# Patient Record
Sex: FEMALE | Race: WHITE | Employment: UNEMPLOYED | URBAN - METROPOLITAN AREA
[De-identification: names, ages, dates, MRNs, and addresses within clinical notes are randomized per-mention and may not be internally consistent; named-entity substitution may affect disease eponyms.]

---

## 2018-02-16 ENCOUNTER — OFFICE VISIT (OUTPATIENT)
Dept: FAMILY MEDICINE CLINIC | Facility: CLINIC | Age: 63
End: 2018-02-16

## 2018-02-16 VITALS
BODY MASS INDEX: 25.78 KG/M2 | SYSTOLIC BLOOD PRESSURE: 122 MMHG | OXYGEN SATURATION: 98 % | TEMPERATURE: 98 F | RESPIRATION RATE: 18 BRPM | HEIGHT: 64 IN | WEIGHT: 151 LBS | HEART RATE: 78 BPM | DIASTOLIC BLOOD PRESSURE: 80 MMHG

## 2018-02-16 DIAGNOSIS — L98.9 SKIN LESION OF CHEEK: ICD-10-CM

## 2018-02-16 DIAGNOSIS — N83.8 ENLARGED OVARY: ICD-10-CM

## 2018-02-16 DIAGNOSIS — Z01.419 WELL WOMAN EXAM: Primary | ICD-10-CM

## 2018-02-16 DIAGNOSIS — Z23 NEED FOR TDAP VACCINATION: ICD-10-CM

## 2018-02-16 DIAGNOSIS — Z12.31 VISIT FOR SCREENING MAMMOGRAM: ICD-10-CM

## 2018-02-16 DIAGNOSIS — D22.9 MULTIPLE NEVI: ICD-10-CM

## 2018-02-16 PROCEDURE — 99396 PREV VISIT EST AGE 40-64: CPT | Performed by: FAMILY MEDICINE

## 2018-02-16 PROCEDURE — 87624 HPV HI-RISK TYP POOLED RSLT: CPT | Performed by: FAMILY MEDICINE

## 2018-02-16 PROCEDURE — 90471 IMMUNIZATION ADMIN: CPT | Performed by: FAMILY MEDICINE

## 2018-02-16 PROCEDURE — 88175 CYTOPATH C/V AUTO FLUID REDO: CPT | Performed by: FAMILY MEDICINE

## 2018-02-16 PROCEDURE — 90715 TDAP VACCINE 7 YRS/> IM: CPT | Performed by: FAMILY MEDICINE

## 2018-02-16 RX ORDER — INFLUENZA VIRUS VACCINE 15; 15; 15; 15 UG/.5ML; UG/.5ML; UG/.5ML; UG/.5ML
SUSPENSION INTRAMUSCULAR
Refills: 0 | COMMUNITY
Start: 2018-01-02 | End: 2018-02-23

## 2018-02-16 NOTE — PROGRESS NOTES
El Flood is a 63year old female. HPI:  Pt is here for WWE  Postmenopausal, no menses now for about 4 years  No abnormal vag d/c, no spotting, no pelvic pain. No sig vaginal dryness  occ Urge incontinence. no SALLY  No dysuria  No abdominal pain.   Exerc denies abdominal pain and denies heartburn  : as above  NEURO: denies headaches, denies dizziness    EXAM:  /80   Pulse 78   Temp 98.2 °F (36.8 °C) (Oral)   Resp 18   Ht 64\"   Wt 151 lb   SpO2 98%   BMI 25.92 kg/m²   Wt Readings from Last 6 Encoun help w/ constipation.   - THINPREP PAP WITH HPV REFLEX REQUEST B;- HPV REFLEX PAP NEG W/ GENOTYPE    2. Visit for screening mammogram  - [facility] SCREENING BILAT (CPT=77067); Future    3. Enlarged ovary  Pt w/ bulkiness of L side of uterus and adnexa on exam. ?

## 2018-02-20 LAB — HPV I/H RISK 1 DNA SPEC QL NAA+PROBE: NEGATIVE

## 2018-02-21 ENCOUNTER — NURSE ONLY (OUTPATIENT)
Dept: FAMILY MEDICINE CLINIC | Facility: CLINIC | Age: 63
End: 2018-02-21

## 2018-02-21 ENCOUNTER — HOSPITAL ENCOUNTER (OUTPATIENT)
Dept: ULTRASOUND IMAGING | Age: 63
Discharge: HOME | End: 2018-02-21
Attending: FAMILY MEDICINE
Payer: COMMERCIAL

## 2018-02-21 ENCOUNTER — HOSPITAL ENCOUNTER (OUTPATIENT)
Dept: MAMMOGRAPHY | Age: 63
Discharge: HOME | End: 2018-02-21
Attending: FAMILY MEDICINE
Payer: COMMERCIAL

## 2018-02-21 DIAGNOSIS — N83.8 ENLARGED OVARY: ICD-10-CM

## 2018-02-21 DIAGNOSIS — Z13.21 ENCOUNTER FOR VITAMIN DEFICIENCY SCREENING: ICD-10-CM

## 2018-02-21 DIAGNOSIS — Z00.00 LABORATORY EXAMINATION ORDERED AS PART OF A COMPLETE PHYSICAL EXAMINATION: Primary | ICD-10-CM

## 2018-02-21 DIAGNOSIS — Z12.31 VISIT FOR SCREENING MAMMOGRAM: ICD-10-CM

## 2018-02-21 LAB
BASOPHILS # BLD AUTO: 0.03 X10(3) UL (ref 0–0.1)
BASOPHILS NFR BLD AUTO: 0.7 %
EOSINOPHIL # BLD AUTO: 0.1 X10(3) UL (ref 0–0.3)
EOSINOPHIL NFR BLD AUTO: 2.3 %
ERYTHROCYTE [DISTWIDTH] IN BLOOD BY AUTOMATED COUNT: 13 % (ref 11.5–16)
HCT VFR BLD AUTO: 39.8 % (ref 34–50)
HGB BLD-MCNC: 12.5 G/DL (ref 12–16)
IMMATURE GRANULOCYTE COUNT: 0.01 X10(3) UL (ref 0–1)
IMMATURE GRANULOCYTE RATIO %: 0.2 %
LYMPHOCYTES # BLD AUTO: 1.19 X10(3) UL (ref 0.9–4)
LYMPHOCYTES NFR BLD AUTO: 27.5 %
MCH RBC QN AUTO: 28.3 PG (ref 27–33.2)
MCHC RBC AUTO-ENTMCNC: 31.4 G/DL (ref 31–37)
MCV RBC AUTO: 90.2 FL (ref 81–100)
MONOCYTES # BLD AUTO: 0.29 X10(3) UL (ref 0.1–1)
MONOCYTES NFR BLD AUTO: 6.7 %
NEUTROPHIL ABS PRELIM: 2.7 X10 (3) UL (ref 1.3–6.7)
NEUTROPHILS # BLD AUTO: 2.7 X10(3) UL (ref 1.3–6.7)
NEUTROPHILS NFR BLD AUTO: 62.6 %
PLATELET # BLD AUTO: 168 10(3)UL (ref 150–450)
RBC # BLD AUTO: 4.41 X10(6)UL (ref 3.8–5.1)
RED CELL DISTRIBUTION WIDTH-SD: 43.2 FL (ref 35.1–46.3)
WBC # BLD AUTO: 4.3 X10(3) UL (ref 4–13)

## 2018-02-21 PROCEDURE — 76830 TRANSVAGINAL US NON-OB: CPT | Performed by: FAMILY MEDICINE

## 2018-02-21 PROCEDURE — 77067 SCR MAMMO BI INCL CAD: CPT | Performed by: FAMILY MEDICINE

## 2018-02-21 PROCEDURE — 76856 US EXAM PELVIC COMPLETE: CPT | Performed by: FAMILY MEDICINE

## 2018-02-21 PROCEDURE — 36415 COLL VENOUS BLD VENIPUNCTURE: CPT | Performed by: FAMILY MEDICINE

## 2018-02-21 PROCEDURE — 80050 GENERAL HEALTH PANEL: CPT | Performed by: FAMILY MEDICINE

## 2018-02-21 PROCEDURE — 82306 VITAMIN D 25 HYDROXY: CPT | Performed by: FAMILY MEDICINE

## 2018-02-21 PROCEDURE — 80061 LIPID PANEL: CPT | Performed by: FAMILY MEDICINE

## 2018-02-22 LAB
25-HYDROXYVITAMIN D (TOTAL): 18.5 NG/ML (ref 30–100)
ALBUMIN SERPL-MCNC: 4.2 G/DL (ref 3.5–4.8)
ALP LIVER SERPL-CCNC: 93 U/L (ref 46–118)
ALT SERPL-CCNC: 19 U/L (ref 14–54)
AST SERPL-CCNC: 21 U/L (ref 15–41)
BILIRUB SERPL-MCNC: 0.5 MG/DL (ref 0.1–2)
BUN BLD-MCNC: 18 MG/DL (ref 8–20)
CALCIUM BLD-MCNC: 9.6 MG/DL (ref 8.3–10.3)
CHLORIDE: 107 MMOL/L (ref 101–111)
CHOLEST SMN-MCNC: 232 MG/DL (ref ?–200)
CO2: 29 MMOL/L (ref 22–32)
CREAT BLD-MCNC: 1.08 MG/DL (ref 0.55–1.02)
GLUCOSE BLD-MCNC: 85 MG/DL (ref 70–99)
HDLC SERPL-MCNC: 94 MG/DL (ref 45–?)
HDLC SERPL: 2.47 {RATIO} (ref ?–4.44)
LDLC SERPL CALC-MCNC: 120 MG/DL (ref ?–130)
M PROTEIN MFR SERPL ELPH: 8.1 G/DL (ref 6.1–8.3)
NONHDLC SERPL-MCNC: 138 MG/DL (ref ?–130)
POTASSIUM SERPL-SCNC: 4.5 MMOL/L (ref 3.6–5.1)
SODIUM SERPL-SCNC: 141 MMOL/L (ref 136–144)
TRIGL SERPL-MCNC: 92 MG/DL (ref ?–150)
TSI SER-ACNC: 1.44 MIU/ML (ref 0.35–5.5)
VLDLC SERPL CALC-MCNC: 18 MG/DL (ref 5–40)

## 2018-02-23 PROBLEM — D12.6 TUBULAR ADENOMA OF COLON: Status: ACTIVE | Noted: 2018-02-23

## 2018-03-06 ENCOUNTER — OFFICE VISIT (OUTPATIENT)
Dept: FAMILY MEDICINE CLINIC | Facility: CLINIC | Age: 63
End: 2018-03-06

## 2018-03-06 VITALS
OXYGEN SATURATION: 98 % | DIASTOLIC BLOOD PRESSURE: 66 MMHG | SYSTOLIC BLOOD PRESSURE: 106 MMHG | BODY MASS INDEX: 26.18 KG/M2 | TEMPERATURE: 98 F | HEIGHT: 64 IN | HEART RATE: 59 BPM | WEIGHT: 153.38 LBS | RESPIRATION RATE: 16 BRPM

## 2018-03-06 DIAGNOSIS — E55.9 VITAMIN D DEFICIENCY: ICD-10-CM

## 2018-03-06 DIAGNOSIS — N85.9 LESION OF ENDOMETRIUM: ICD-10-CM

## 2018-03-06 DIAGNOSIS — S33.5XXA LUMBAR SPRAIN, INITIAL ENCOUNTER: ICD-10-CM

## 2018-03-06 DIAGNOSIS — N83.202 CYST OF LEFT OVARY: Primary | ICD-10-CM

## 2018-03-06 PROCEDURE — 99213 OFFICE O/P EST LOW 20 MIN: CPT | Performed by: FAMILY MEDICINE

## 2018-03-13 NOTE — PROGRESS NOTES
Sarah Banks is a 66year old female. HPI:  Patient is here for follow-up on recent test results. Also having some acute low back pain since yesterday. Pain is across lower back,  Pain worse with certain movements.    No pain radiating down the lower extre back pain, pleasant affect   back: No spinal tenderness. Bilateral paralumbar tenderness /spasm. Negative straight leg raise. Motor strength 5/5 bilateral lower extremities. Normal gait      Assessment and plan:    1. Cyst of left ovary  2.  Isamar Karina

## 2018-03-23 RX ORDER — PYRIDOXINE HCL (VITAMIN B6) 100 MG
TABLET ORAL
COMMUNITY

## 2018-03-23 RX ORDER — MELATONIN
325
COMMUNITY
End: 2020-11-03

## 2018-03-23 RX ORDER — MAGNESIUM OXIDE 400 MG (241.3 MG MAGNESIUM) TABLET
100 TABLET DAILY
COMMUNITY

## 2018-03-24 ENCOUNTER — ANESTHESIA EVENT (OUTPATIENT)
Dept: SURGERY | Facility: HOSPITAL | Age: 63
End: 2018-03-24
Payer: COMMERCIAL

## 2018-04-03 ENCOUNTER — SURGERY (OUTPATIENT)
Age: 63
End: 2018-04-03

## 2018-04-03 ENCOUNTER — HOSPITAL ENCOUNTER (OUTPATIENT)
Facility: HOSPITAL | Age: 63
Setting detail: HOSPITAL OUTPATIENT SURGERY
Discharge: HOME | End: 2018-04-03
Attending: OBSTETRICS & GYNECOLOGY | Admitting: OBSTETRICS & GYNECOLOGY
Payer: COMMERCIAL

## 2018-04-03 ENCOUNTER — ANESTHESIA (OUTPATIENT)
Dept: SURGERY | Facility: HOSPITAL | Age: 63
End: 2018-04-03
Payer: COMMERCIAL

## 2018-04-03 VITALS
RESPIRATION RATE: 16 BRPM | BODY MASS INDEX: 25.82 KG/M2 | TEMPERATURE: 98 F | HEIGHT: 64 IN | OXYGEN SATURATION: 100 % | SYSTOLIC BLOOD PRESSURE: 127 MMHG | WEIGHT: 151.25 LBS | HEART RATE: 73 BPM | DIASTOLIC BLOOD PRESSURE: 73 MMHG

## 2018-04-03 PROBLEM — N83.202 CYST OF LEFT OVARY: Status: ACTIVE | Noted: 2018-04-03

## 2018-04-03 PROCEDURE — 88305 TISSUE EXAM BY PATHOLOGIST: CPT | Performed by: OBSTETRICS & GYNECOLOGY

## 2018-04-03 PROCEDURE — 0UT24ZZ RESECTION OF BILATERAL OVARIES, PERCUTANEOUS ENDOSCOPIC APPROACH: ICD-10-PCS | Performed by: OBSTETRICS & GYNECOLOGY

## 2018-04-03 PROCEDURE — 0UT74ZZ RESECTION OF BILATERAL FALLOPIAN TUBES, PERCUTANEOUS ENDOSCOPIC APPROACH: ICD-10-PCS | Performed by: OBSTETRICS & GYNECOLOGY

## 2018-04-03 RX ORDER — MIDAZOLAM HYDROCHLORIDE 1 MG/ML
1 INJECTION INTRAMUSCULAR; INTRAVENOUS EVERY 5 MIN PRN
Status: DISCONTINUED | OUTPATIENT
Start: 2018-04-03 | End: 2018-04-03

## 2018-04-03 RX ORDER — HYDROCODONE BITARTRATE AND ACETAMINOPHEN 5; 325 MG/1; MG/1
1 TABLET ORAL AS NEEDED
Status: DISCONTINUED | OUTPATIENT
Start: 2018-04-03 | End: 2018-04-03

## 2018-04-03 RX ORDER — SODIUM CHLORIDE, SODIUM LACTATE, POTASSIUM CHLORIDE, CALCIUM CHLORIDE 600; 310; 30; 20 MG/100ML; MG/100ML; MG/100ML; MG/100ML
INJECTION, SOLUTION INTRAVENOUS CONTINUOUS
Status: DISCONTINUED | OUTPATIENT
Start: 2018-04-03 | End: 2018-04-03

## 2018-04-03 RX ORDER — ONDANSETRON 2 MG/ML
4 INJECTION INTRAMUSCULAR; INTRAVENOUS AS NEEDED
Status: DISCONTINUED | OUTPATIENT
Start: 2018-04-03 | End: 2018-04-03

## 2018-04-03 RX ORDER — HYDROCODONE BITARTRATE AND ACETAMINOPHEN 5; 325 MG/1; MG/1
2 TABLET ORAL AS NEEDED
Status: DISCONTINUED | OUTPATIENT
Start: 2018-04-03 | End: 2018-04-03

## 2018-04-03 RX ORDER — DEXAMETHASONE SODIUM PHOSPHATE 4 MG/ML
VIAL (ML) INJECTION
Status: COMPLETED
Start: 2018-04-03 | End: 2018-04-03

## 2018-04-03 RX ORDER — NALOXONE HYDROCHLORIDE 0.4 MG/ML
80 INJECTION, SOLUTION INTRAMUSCULAR; INTRAVENOUS; SUBCUTANEOUS AS NEEDED
Status: DISCONTINUED | OUTPATIENT
Start: 2018-04-03 | End: 2018-04-03

## 2018-04-03 RX ORDER — DIPHENHYDRAMINE HYDROCHLORIDE 50 MG/ML
12.5 INJECTION INTRAMUSCULAR; INTRAVENOUS AS NEEDED
Status: DISCONTINUED | OUTPATIENT
Start: 2018-04-03 | End: 2018-04-03

## 2018-04-03 RX ORDER — ALPRAZOLAM 0.25 MG/1
0.25 TABLET ORAL NIGHTLY PRN
COMMUNITY
End: 2020-11-03

## 2018-04-03 RX ORDER — CLINDAMYCIN PHOSPHATE 900 MG/50ML
INJECTION INTRAVENOUS
Status: DISCONTINUED
Start: 2018-04-03 | End: 2018-04-03

## 2018-04-03 RX ORDER — CLINDAMYCIN PHOSPHATE 900 MG/50ML
900 INJECTION INTRAVENOUS ONCE
Status: DISCONTINUED | OUTPATIENT
Start: 2018-04-03 | End: 2018-04-03 | Stop reason: HOSPADM

## 2018-04-03 RX ORDER — BUPIVACAINE HYDROCHLORIDE AND EPINEPHRINE 2.5; 5 MG/ML; UG/ML
INJECTION, SOLUTION EPIDURAL; INFILTRATION; INTRACAUDAL; PERINEURAL AS NEEDED
Status: DISCONTINUED | OUTPATIENT
Start: 2018-04-03 | End: 2018-04-03 | Stop reason: HOSPADM

## 2018-04-03 RX ORDER — DEXAMETHASONE SODIUM PHOSPHATE 4 MG/ML
4 VIAL (ML) INJECTION AS NEEDED
Status: DISCONTINUED | OUTPATIENT
Start: 2018-04-03 | End: 2018-04-03

## 2018-04-03 RX ORDER — METOCLOPRAMIDE HYDROCHLORIDE 5 MG/ML
10 INJECTION INTRAMUSCULAR; INTRAVENOUS AS NEEDED
Status: DISCONTINUED | OUTPATIENT
Start: 2018-04-03 | End: 2018-04-03

## 2018-04-03 RX ORDER — MORPHINE SULFATE 4 MG/ML
2 INJECTION, SOLUTION INTRAMUSCULAR; INTRAVENOUS EVERY 5 MIN PRN
Status: DISCONTINUED | OUTPATIENT
Start: 2018-04-03 | End: 2018-04-03

## 2018-04-03 RX ORDER — MEPERIDINE HYDROCHLORIDE 25 MG/ML
12.5 INJECTION INTRAMUSCULAR; INTRAVENOUS; SUBCUTANEOUS AS NEEDED
Status: DISCONTINUED | OUTPATIENT
Start: 2018-04-03 | End: 2018-04-03

## 2018-04-03 NOTE — H&P
[facility]    PATIENT'S NAME: Carina Maria Fernanda, [de-identified]   ATTENDING PHYSICIAN: Svetlana Perez M.D.    PATIENT ACCOUNT#:   [de-identified]    LOCATION:  [facility]  MEDICAL RECORD #:   [id number]       YOB: 1955  ADMISSION DATE:       04/03/2018 Cervix, vagina appeared normal.  Uterus, normal size. Left side fullness felt. The ultrasound revealed uterus 7 x 4 x 4 cm. The endometrium, 3 mm. Two small fibroids noted in the anterior and posterior wall of the uterus.   Right ovary appeared normal

## 2018-04-03 NOTE — BRIEF OP NOTE
Pre-Operative Diagnosis: LEFT ADNEXAL CYST, ABDOMINAL PAIN     Post-Operative Diagnosis: LEFT ADNEXAL CYST, ABDOMINAL PAIN      Procedure Performed:   Procedure(s):  LAPAROSCOPIC BILATERAL SALPINGO OOPHORECTOMY WITH LEFT CYSTECTOMY    Surgeon(s) and Role:

## 2018-04-03 NOTE — ANESTHESIA PREPROCEDURE EVALUATION
PRE-OP EVALUATION    Patient Name: Sean Schumacher    Pre-op Diagnosis: LEFT ADNEXAL CYST, ABDOMINAL PAIN    Procedure(s):  LAPAROSCOPIC BILATERAL SALPINGO OOPHORECTOMY WITH LEFT CYSTECTOMY, POSSIBLE LAPAROTOMY    Surgeon(s) and Role:     * Hinkle Marrow 0.5 oz/week    1 Standard drinks or equivalent per week         Comment: Occasional        Drug use: No     Available pre-op labs reviewed.     Lab Results  Component Value Date   WBC 4.3 02/21/2018   RBC 4.41 02/21/2018   HGB 12.5 02/21/2018   HCT 39.8 02/

## 2018-04-03 NOTE — OPERATIVE REPORT
[facility]    PATIENT'S NAME: Miquel García, [de-identified]   ATTENDING PHYSICIAN: Penny Carrizales M.D. OPERATING PHYSICIAN: Penny Carrizales M.D.    PATIENT ACCOUNT#:   [de-identified]    [facility]  MEDICAL RECORD #:   [facility] the usual sterile fashion with a Betadine prep for the vagina and ChloraPrep for the abdomen. Now the time-out was performed identifying the patient name, date of birth, and the procedure.   Now attention was turned to the patient's vagina where a Mckeon ca cornua region. This was done with the LigaSure by serial clamping, coagulating, and cutting. Now the tube with ovarian cyst was released and this was placed in the cul-de-sac.   Now attention was turned to the patient's right side where the tube and the o removed. The patient tolerated the procedure well. All the instrument and sponge counts were reported to be correct. She did receive clindamycin as preoperative antibiotics.   She was awakened from anesthesia and taken to the recovery room in stable cond

## 2018-04-03 NOTE — ANESTHESIA POSTPROCEDURE EVALUATION
KI Seay 106 Patient Status:  Hospital Outpatient Surgery   Age/Gender 66year old female MRN [id number]   Location [address] [place] [address], [facility] Day # 0 PCP Jose Juan Carrasco MD

## 2020-06-04 ENCOUNTER — TELEPHONE (OUTPATIENT)
Dept: FAMILY MEDICINE CLINIC | Facility: CLINIC | Age: 65
End: 2020-06-04

## 2020-06-04 ENCOUNTER — VIRTUAL PHONE E/M (OUTPATIENT)
Dept: FAMILY MEDICINE CLINIC | Facility: CLINIC | Age: 65
End: 2020-06-04
Payer: COMMERCIAL

## 2020-06-04 DIAGNOSIS — L23.7 POISON IVY: Primary | ICD-10-CM

## 2020-06-04 PROCEDURE — 99213 OFFICE O/P EST LOW 20 MIN: CPT | Performed by: FAMILY MEDICINE

## 2020-06-04 RX ORDER — PREDNISONE 20 MG/1
20 TABLET ORAL DAILY
Qty: 5 TABLET | Refills: 0 | Status: SHIPPED | OUTPATIENT
Start: 2020-06-04 | End: 2020-09-04

## 2020-06-04 NOTE — PROGRESS NOTES
Please note that the following visit was completed using two-way, real-time interactive audio and video communication.   This has been done in good davian to provide continuity of care in the best interest of the provider-patient relationship, due to the o REVIEW OF SYSTEMS:  Review of Systems:   Constitutional: No fevers, chills, fatigue or night sweats. ENT: No mouth pain, neck pain, running nose, headaches or swollen glands.   Skin: As per HPI  Respiratory: Denies cough, wheezing or shortness of breat

## 2020-06-04 NOTE — TELEPHONE ENCOUNTER
Patient stated she contracted poison sumac told to her by a friend of hers that is a paramedic. Dr. Mejia Point doen't have an opening for a video until Monday. Patient cant wait that long. Please advise how she should proceed.

## 2020-06-04 NOTE — TELEPHONE ENCOUNTER
Please offer appt today for video visit with Dr. Theo Quinn or virtual with Dr. Sujata Beltran tomorrow.

## 2020-09-04 ENCOUNTER — TELEPHONE (OUTPATIENT)
Dept: FAMILY MEDICINE CLINIC | Facility: CLINIC | Age: 65
End: 2020-09-04

## 2020-09-04 DIAGNOSIS — L23.7 POISON IVY: ICD-10-CM

## 2020-09-04 RX ORDER — PREDNISONE 20 MG/1
20 TABLET ORAL DAILY
Qty: 5 TABLET | Refills: 0 | Status: SHIPPED | OUTPATIENT
Start: 2020-09-04 | End: 2020-09-09

## 2020-09-04 NOTE — TELEPHONE ENCOUNTER
Dr. Vladislav Garces,  Please advise    Patient was seen by Dr. Simona Lorenz 6/4/2020:   Romina Dash is a 63year old female who presents for development of a rash on her arms after she working in the backyard with weeds and mulch    Poison ivy  -Discussed contact dermatitis

## 2020-09-04 NOTE — TELEPHONE ENCOUNTER
The patient had poison ivy like she did a month ago. She is wondering if the doctor would be able to order medication for it again.      Patient currently is in [place], [address]

## 2020-09-04 NOTE — TELEPHONE ENCOUNTER
Spoke with patient. In [place] right now. States has rash similar to previous on bilateral upper extremities. Few lesions on each forearm. Today noted a new lesion. Rash is not as diffuse as it was previously. Started 5 days ago.   Thinks triggered b

## 2020-10-05 ENCOUNTER — TELEPHONE (OUTPATIENT)
Dept: FAMILY MEDICINE CLINIC | Facility: CLINIC | Age: 65
End: 2020-10-05

## 2020-10-05 NOTE — TELEPHONE ENCOUNTER
Patient LVM for the office stating she was exposed to someone at work who tested positive for COVID. Patient states she is going to quarantine however she is wanting to know if there is anything else she should be doing.

## 2020-10-05 NOTE — TELEPHONE ENCOUNTER
Called pt stating no direct contact with COVID positive co-worker. Informed close contact is considered less than 6 feet, more than 15mins. Co-worker passes by desk occasionally. No mask are worn in work place. Last exposure was Thursday (10/1/20).  Co-work

## 2020-11-03 ENCOUNTER — OFFICE VISIT (OUTPATIENT)
Dept: FAMILY MEDICINE CLINIC | Facility: CLINIC | Age: 65
End: 2020-11-03
Payer: COMMERCIAL

## 2020-11-03 VITALS
OXYGEN SATURATION: 99 % | RESPIRATION RATE: 16 BRPM | WEIGHT: 151 LBS | TEMPERATURE: 97 F | DIASTOLIC BLOOD PRESSURE: 68 MMHG | HEIGHT: 64 IN | SYSTOLIC BLOOD PRESSURE: 114 MMHG | HEART RATE: 78 BPM | BODY MASS INDEX: 25.78 KG/M2

## 2020-11-03 DIAGNOSIS — H61.22 IMPACTED CERUMEN, LEFT EAR: ICD-10-CM

## 2020-11-03 DIAGNOSIS — J30.1 SEASONAL ALLERGIC RHINITIS DUE TO POLLEN: ICD-10-CM

## 2020-11-03 DIAGNOSIS — Z01.419 WELL WOMAN EXAM WITH ROUTINE GYNECOLOGICAL EXAM: Primary | ICD-10-CM

## 2020-11-03 DIAGNOSIS — Z80.0 FAMILY HISTORY OF COLON CANCER: ICD-10-CM

## 2020-11-03 DIAGNOSIS — R51.9 SINUS HEADACHE: ICD-10-CM

## 2020-11-03 DIAGNOSIS — Z12.31 VISIT FOR SCREENING MAMMOGRAM: ICD-10-CM

## 2020-11-03 DIAGNOSIS — D12.6 TUBULAR ADENOMA OF COLON: ICD-10-CM

## 2020-11-03 DIAGNOSIS — R42 ORTHOSTATIC LIGHTHEADEDNESS: ICD-10-CM

## 2020-11-03 DIAGNOSIS — M54.42 CHRONIC LEFT-SIDED LOW BACK PAIN WITH LEFT-SIDED SCIATICA: ICD-10-CM

## 2020-11-03 DIAGNOSIS — D22.9 MULTIPLE NEVI: ICD-10-CM

## 2020-11-03 DIAGNOSIS — Z00.00 LABORATORY EXAMINATION ORDERED AS PART OF A COMPLETE PHYSICAL EXAMINATION: ICD-10-CM

## 2020-11-03 DIAGNOSIS — L98.9 FACIAL LESION: ICD-10-CM

## 2020-11-03 DIAGNOSIS — G89.29 CHRONIC LEFT-SIDED LOW BACK PAIN WITH LEFT-SIDED SCIATICA: ICD-10-CM

## 2020-11-03 PROCEDURE — 3078F DIAST BP <80 MM HG: CPT | Performed by: FAMILY MEDICINE

## 2020-11-03 PROCEDURE — 3074F SYST BP LT 130 MM HG: CPT | Performed by: FAMILY MEDICINE

## 2020-11-03 PROCEDURE — 3008F BODY MASS INDEX DOCD: CPT | Performed by: FAMILY MEDICINE

## 2020-11-03 PROCEDURE — 87624 HPV HI-RISK TYP POOLED RSLT: CPT | Performed by: FAMILY MEDICINE

## 2020-11-03 PROCEDURE — 99396 PREV VISIT EST AGE 40-64: CPT | Performed by: FAMILY MEDICINE

## 2020-11-03 RX ORDER — FLUTICASONE PROPIONATE 50 MCG
2 SPRAY, SUSPENSION (ML) NASAL DAILY
Qty: 1 BOTTLE | Refills: 1 | Status: SHIPPED | OUTPATIENT
Start: 2020-11-03 | End: 2021-10-29

## 2020-11-03 RX ORDER — CHOLECALCIFEROL (VITAMIN D3) 50 MCG
TABLET ORAL
COMMUNITY

## 2020-11-03 NOTE — PROGRESS NOTES
Dewey Espinoza is a 63year old female. HPI:  Pt is here for routine physical/WWE  Stopped working about 1 month ago  Had coronavirus contact when working last month, no sxs, did testing and was negative.    Has been social distancing  Does a lot of walking an Allergic rhinitis due to other allergen    • Asthma    • Family history of other cardiovascular diseases    • H/O colonoscopy    • Tubular adenoma of colon    • Urinary tract infection, site not specified        Past Surgical History:   Procedure Lateralit atraumatic, normocephalic,  Conj clear, EOMI, PERRL  TMs: Left ear canal with dense cerumen impaction, unable to visualize TM. Right ear canal with partial cerumen impaction. TM visualized and clear.   OMM, throat clear  Nasal mucosa: normal  NECK: supple given  OTC analgesics prn.  - XR LUMBAR SPINE (MIN 4 VIEWS) (CPT=72971); Future    7. Orthostatic lightheadedness  Keep well hydrated. Slow position changes  Ck CBC with labs as ordered  Call or f/u if symptoms persist or worsen. 8. Facial lesion  9.  Mu

## 2020-11-12 ENCOUNTER — HOSPITAL ENCOUNTER (OUTPATIENT)
Dept: GENERAL RADIOLOGY | Facility: HOSPITAL | Age: 65
Discharge: HOME | End: 2020-11-12
Attending: FAMILY MEDICINE
Payer: COMMERCIAL

## 2020-11-12 DIAGNOSIS — M54.42 CHRONIC LEFT-SIDED LOW BACK PAIN WITH LEFT-SIDED SCIATICA: ICD-10-CM

## 2020-11-12 DIAGNOSIS — G89.29 CHRONIC LEFT-SIDED LOW BACK PAIN WITH LEFT-SIDED SCIATICA: ICD-10-CM

## 2020-11-12 PROCEDURE — 72110 X-RAY EXAM L-2 SPINE 4/>VWS: CPT | Performed by: FAMILY MEDICINE

## 2020-11-14 PROBLEM — N83.202 CYST OF LEFT OVARY: Status: RESOLVED | Noted: 2018-04-03 | Resolved: 2020-11-14

## 2020-12-16 ENCOUNTER — LAB ENCOUNTER (OUTPATIENT)
Dept: LAB | Age: 65
End: 2020-12-16
Attending: FAMILY MEDICINE
Payer: COMMERCIAL

## 2020-12-16 ENCOUNTER — HOSPITAL ENCOUNTER (OUTPATIENT)
Dept: MAMMOGRAPHY | Age: 65
Discharge: HOME | End: 2020-12-16
Attending: FAMILY MEDICINE
Payer: COMMERCIAL

## 2020-12-16 DIAGNOSIS — Z12.31 VISIT FOR SCREENING MAMMOGRAM: ICD-10-CM

## 2020-12-16 DIAGNOSIS — Z00.00 LABORATORY EXAMINATION ORDERED AS PART OF A COMPLETE PHYSICAL EXAMINATION: ICD-10-CM

## 2020-12-16 PROCEDURE — 77063 BREAST TOMOSYNTHESIS BI: CPT | Performed by: FAMILY MEDICINE

## 2020-12-16 PROCEDURE — 80061 LIPID PANEL: CPT

## 2020-12-16 PROCEDURE — 85025 COMPLETE CBC W/AUTO DIFF WBC: CPT

## 2020-12-16 PROCEDURE — 77067 SCR MAMMO BI INCL CAD: CPT | Performed by: FAMILY MEDICINE

## 2020-12-16 PROCEDURE — 36415 COLL VENOUS BLD VENIPUNCTURE: CPT

## 2020-12-16 PROCEDURE — 83036 HEMOGLOBIN GLYCOSYLATED A1C: CPT

## 2020-12-16 PROCEDURE — 80053 COMPREHEN METABOLIC PANEL: CPT

## 2020-12-16 PROCEDURE — 84443 ASSAY THYROID STIM HORMONE: CPT

## 2021-02-06 ENCOUNTER — LAB ENCOUNTER (OUTPATIENT)
Dept: LAB | Facility: HOSPITAL | Age: 66
End: 2021-02-06
Attending: INTERNAL MEDICINE
Payer: COMMERCIAL

## 2021-02-06 DIAGNOSIS — Z01.818 PRE-OP TESTING: ICD-10-CM

## 2021-02-07 LAB — SARS-COV-2 RNA RESP QL NAA+PROBE: NOT DETECTED

## 2021-02-09 PROBLEM — Z80.0 FAMILY HISTORY OF COLON CANCER: Status: ACTIVE | Noted: 2021-02-09

## 2021-02-09 PROBLEM — Z86.010 PERSONAL HISTORY OF COLONIC POLYPS: Status: ACTIVE | Noted: 2021-02-09

## 2021-02-09 PROBLEM — Z83.719 FAMILY HISTORY OF COLONIC POLYPS: Status: ACTIVE | Noted: 2021-02-09

## 2021-02-09 PROBLEM — Z83.71 FAMILY HISTORY OF COLONIC POLYPS: Status: ACTIVE | Noted: 2021-02-09

## 2021-02-09 PROBLEM — Z86.0100 PERSONAL HISTORY OF COLONIC POLYPS: Status: ACTIVE | Noted: 2021-02-09

## 2023-09-12 ENCOUNTER — OFFICE VISIT (OUTPATIENT)
Dept: FAMILY MEDICINE CLINIC | Facility: CLINIC | Age: 68
End: 2023-09-12
Payer: MEDICARE

## 2023-09-12 VITALS
HEIGHT: 63 IN | OXYGEN SATURATION: 97 % | RESPIRATION RATE: 16 BRPM | SYSTOLIC BLOOD PRESSURE: 124 MMHG | BODY MASS INDEX: 27.33 KG/M2 | WEIGHT: 154.25 LBS | DIASTOLIC BLOOD PRESSURE: 80 MMHG | HEART RATE: 73 BPM | TEMPERATURE: 97 F

## 2023-09-12 DIAGNOSIS — Z11.59 NEED FOR HEPATITIS C SCREENING TEST: ICD-10-CM

## 2023-09-12 DIAGNOSIS — H61.22 LEFT EAR IMPACTED CERUMEN: ICD-10-CM

## 2023-09-12 DIAGNOSIS — Z00.00 LABORATORY EXAMINATION ORDERED AS PART OF A COMPLETE PHYSICAL EXAMINATION: ICD-10-CM

## 2023-09-12 DIAGNOSIS — H53.9 VISION DISTURBANCE: ICD-10-CM

## 2023-09-12 DIAGNOSIS — E55.9 VITAMIN D DEFICIENCY: ICD-10-CM

## 2023-09-12 DIAGNOSIS — Z12.31 VISIT FOR SCREENING MAMMOGRAM: ICD-10-CM

## 2023-09-12 DIAGNOSIS — E66.3 OVERWEIGHT (BMI 25.0-29.9): ICD-10-CM

## 2023-09-12 DIAGNOSIS — Z00.00 ENCOUNTER FOR ANNUAL HEALTH EXAMINATION: Primary | ICD-10-CM

## 2023-09-12 DIAGNOSIS — Z78.0 POSTMENOPAUSAL: ICD-10-CM

## 2023-09-12 DIAGNOSIS — Z86.010 PERSONAL HISTORY OF COLONIC POLYPS: ICD-10-CM

## 2023-09-12 DIAGNOSIS — Z83.49 FAMILY HISTORY OF THYROID DISORDER: ICD-10-CM

## 2023-09-12 DIAGNOSIS — E78.00 ELEVATED LDL CHOLESTEROL LEVEL: ICD-10-CM

## 2023-09-12 DIAGNOSIS — Z23 NEED FOR VACCINATION: ICD-10-CM

## 2023-09-12 PROCEDURE — 96160 PT-FOCUSED HLTH RISK ASSMT: CPT | Performed by: FAMILY MEDICINE

## 2023-09-12 PROCEDURE — 90662 IIV NO PRSV INCREASED AG IM: CPT | Performed by: FAMILY MEDICINE

## 2023-09-12 PROCEDURE — 1126F AMNT PAIN NOTED NONE PRSNT: CPT | Performed by: FAMILY MEDICINE

## 2023-09-12 PROCEDURE — 3079F DIAST BP 80-89 MM HG: CPT | Performed by: FAMILY MEDICINE

## 2023-09-12 PROCEDURE — 3008F BODY MASS INDEX DOCD: CPT | Performed by: FAMILY MEDICINE

## 2023-09-12 PROCEDURE — 90677 PCV20 VACCINE IM: CPT | Performed by: FAMILY MEDICINE

## 2023-09-12 PROCEDURE — G0402 INITIAL PREVENTIVE EXAM: HCPCS | Performed by: FAMILY MEDICINE

## 2023-09-12 PROCEDURE — G0009 ADMIN PNEUMOCOCCAL VACCINE: HCPCS | Performed by: FAMILY MEDICINE

## 2023-09-12 PROCEDURE — 1159F MED LIST DOCD IN RCRD: CPT | Performed by: FAMILY MEDICINE

## 2023-09-12 PROCEDURE — G0008 ADMIN INFLUENZA VIRUS VAC: HCPCS | Performed by: FAMILY MEDICINE

## 2023-09-12 PROCEDURE — 1170F FXNL STATUS ASSESSED: CPT | Performed by: FAMILY MEDICINE

## 2023-09-12 PROCEDURE — 3074F SYST BP LT 130 MM HG: CPT | Performed by: FAMILY MEDICINE

## 2023-09-12 PROCEDURE — 1160F RVW MEDS BY RX/DR IN RCRD: CPT | Performed by: FAMILY MEDICINE

## 2023-12-23 ENCOUNTER — LAB ENCOUNTER (OUTPATIENT)
Dept: LAB | Age: 68
End: 2023-12-23
Attending: FAMILY MEDICINE
Payer: MEDICARE

## 2023-12-23 DIAGNOSIS — Z11.59 NEED FOR HEPATITIS C SCREENING TEST: ICD-10-CM

## 2023-12-23 LAB
ALBUMIN SERPL-MCNC: 4 G/DL (ref 3.4–5)
ALBUMIN/GLOB SERPL: 1 {RATIO} (ref 1–2)
ALP LIVER SERPL-CCNC: 101 U/L
ALT SERPL-CCNC: 27 U/L
ANION GAP SERPL CALC-SCNC: 2 MMOL/L (ref 0–18)
AST SERPL-CCNC: 26 U/L (ref 15–37)
BASOPHILS # BLD AUTO: 0.04 X10(3) UL (ref 0–0.2)
BASOPHILS NFR BLD AUTO: 0.6 %
BILIRUB SERPL-MCNC: 0.5 MG/DL (ref 0.1–2)
BUN BLD-MCNC: 15 MG/DL (ref 9–23)
CALCIUM BLD-MCNC: 9.4 MG/DL (ref 8.5–10.1)
CHLORIDE SERPL-SCNC: 111 MMOL/L (ref 98–112)
CHOLEST SERPL-MCNC: 249 MG/DL (ref ?–200)
CO2 SERPL-SCNC: 27 MMOL/L (ref 21–32)
CREAT BLD-MCNC: 1.17 MG/DL
EGFRCR SERPLBLD CKD-EPI 2021: 51 ML/MIN/1.73M2 (ref 60–?)
EOSINOPHIL # BLD AUTO: 0.14 X10(3) UL (ref 0–0.7)
EOSINOPHIL NFR BLD AUTO: 2.1 %
ERYTHROCYTE [DISTWIDTH] IN BLOOD BY AUTOMATED COUNT: 13.2 %
FASTING PATIENT LIPID ANSWER: YES
FASTING STATUS PATIENT QL REPORTED: YES
GLOBULIN PLAS-MCNC: 3.9 G/DL (ref 2.8–4.4)
GLUCOSE BLD-MCNC: 90 MG/DL (ref 70–99)
HCT VFR BLD AUTO: 41.1 %
HCV AB SERPL QL IA: NONREACTIVE
HDLC SERPL-MCNC: 93 MG/DL (ref 40–59)
HGB BLD-MCNC: 13 G/DL
IMM GRANULOCYTES # BLD AUTO: 0.01 X10(3) UL (ref 0–1)
IMM GRANULOCYTES NFR BLD: 0.2 %
LDLC SERPL CALC-MCNC: 131 MG/DL (ref ?–100)
LYMPHOCYTES # BLD AUTO: 1.56 X10(3) UL (ref 1–4)
LYMPHOCYTES NFR BLD AUTO: 23.9 %
MCH RBC QN AUTO: 28.9 PG (ref 26–34)
MCHC RBC AUTO-ENTMCNC: 31.6 G/DL (ref 31–37)
MCV RBC AUTO: 91.3 FL
MONOCYTES # BLD AUTO: 0.4 X10(3) UL (ref 0.1–1)
MONOCYTES NFR BLD AUTO: 6.1 %
NEUTROPHILS # BLD AUTO: 4.38 X10 (3) UL (ref 1.5–7.7)
NEUTROPHILS # BLD AUTO: 4.38 X10(3) UL (ref 1.5–7.7)
NEUTROPHILS NFR BLD AUTO: 67.1 %
NONHDLC SERPL-MCNC: 156 MG/DL (ref ?–130)
OSMOLALITY SERPL CALC.SUM OF ELEC: 290 MOSM/KG (ref 275–295)
PLATELET # BLD AUTO: 161 10(3)UL (ref 150–450)
POTASSIUM SERPL-SCNC: 4.3 MMOL/L (ref 3.5–5.1)
PROT SERPL-MCNC: 7.9 G/DL (ref 6.4–8.2)
RBC # BLD AUTO: 4.5 X10(6)UL
SODIUM SERPL-SCNC: 140 MMOL/L (ref 136–145)
TRIGL SERPL-MCNC: 145 MG/DL (ref 30–149)
TSI SER-ACNC: 1.72 MIU/ML (ref 0.36–3.74)
VIT D+METAB SERPL-MCNC: 33.8 NG/ML (ref 30–100)
VLDLC SERPL CALC-MCNC: 26 MG/DL (ref 0–30)
WBC # BLD AUTO: 6.5 X10(3) UL (ref 4–11)

## 2023-12-23 PROCEDURE — 82306 VITAMIN D 25 HYDROXY: CPT | Performed by: FAMILY MEDICINE

## 2023-12-23 PROCEDURE — 86803 HEPATITIS C AB TEST: CPT

## 2023-12-23 PROCEDURE — 84443 ASSAY THYROID STIM HORMONE: CPT | Performed by: FAMILY MEDICINE

## 2023-12-23 PROCEDURE — 85025 COMPLETE CBC W/AUTO DIFF WBC: CPT | Performed by: FAMILY MEDICINE

## 2023-12-23 PROCEDURE — 80061 LIPID PANEL: CPT | Performed by: FAMILY MEDICINE

## 2023-12-23 PROCEDURE — 80053 COMPREHEN METABOLIC PANEL: CPT | Performed by: FAMILY MEDICINE

## 2023-12-23 PROCEDURE — 36415 COLL VENOUS BLD VENIPUNCTURE: CPT

## 2024-01-06 ENCOUNTER — HOSPITAL ENCOUNTER (OUTPATIENT)
Dept: MAMMOGRAPHY | Age: 69
Discharge: HOME | End: 2024-01-06
Attending: FAMILY MEDICINE
Payer: MEDICARE

## 2024-01-06 DIAGNOSIS — Z12.31 VISIT FOR SCREENING MAMMOGRAM: ICD-10-CM

## 2024-01-06 PROCEDURE — 77063 BREAST TOMOSYNTHESIS BI: CPT | Performed by: FAMILY MEDICINE

## 2024-01-06 PROCEDURE — 77067 SCR MAMMO BI INCL CAD: CPT | Performed by: FAMILY MEDICINE

## 2024-01-10 DIAGNOSIS — R79.89 ELEVATED SERUM CREATININE: Primary | ICD-10-CM

## 2024-01-24 ENCOUNTER — OFFICE VISIT (OUTPATIENT)
Dept: FAMILY MEDICINE CLINIC | Facility: CLINIC | Age: 69
End: 2024-01-24
Payer: MEDICARE

## 2024-01-24 VITALS
RESPIRATION RATE: 16 BRPM | WEIGHT: 156 LBS | BODY MASS INDEX: 27.64 KG/M2 | TEMPERATURE: 97 F | OXYGEN SATURATION: 100 % | HEIGHT: 63 IN | HEART RATE: 76 BPM | SYSTOLIC BLOOD PRESSURE: 124 MMHG | DIASTOLIC BLOOD PRESSURE: 66 MMHG

## 2024-01-24 DIAGNOSIS — Z12.4 ENCOUNTER FOR PAPANICOLAOU SMEAR FOR CERVICAL CANCER SCREENING: ICD-10-CM

## 2024-01-24 DIAGNOSIS — Z80.51 FAMILY HISTORY OF RENAL CANCER: ICD-10-CM

## 2024-01-24 DIAGNOSIS — M79.18 RIGHT BUTTOCK PAIN: ICD-10-CM

## 2024-01-24 DIAGNOSIS — Z80.0 FAMILY HISTORY OF COLON CANCER: ICD-10-CM

## 2024-01-24 DIAGNOSIS — K59.09 OTHER CONSTIPATION: ICD-10-CM

## 2024-01-24 DIAGNOSIS — R79.89 ELEVATED SERUM CREATININE: ICD-10-CM

## 2024-01-24 DIAGNOSIS — N89.8 VAGINAL LESION: ICD-10-CM

## 2024-01-24 DIAGNOSIS — N81.10 VAGINAL PROLAPSE: ICD-10-CM

## 2024-01-24 DIAGNOSIS — Z86.010 PERSONAL HISTORY OF COLONIC POLYPS: ICD-10-CM

## 2024-01-24 DIAGNOSIS — Z01.419 ENCOUNTER FOR BREAST AND PELVIC EXAMINATION: Primary | ICD-10-CM

## 2024-01-24 LAB
APPEARANCE: CLEAR
BILIRUB UR QL STRIP.AUTO: NEGATIVE
BILIRUBIN: NEGATIVE
CLARITY UR REFRACT.AUTO: CLEAR
COLOR UR AUTO: COLORLESS
GLUCOSE (URINE DIPSTICK): NEGATIVE MG/DL
GLUCOSE UR STRIP.AUTO-MCNC: NORMAL MG/DL
KETONES (URINE DIPSTICK): NEGATIVE MG/DL
KETONES UR STRIP.AUTO-MCNC: NEGATIVE MG/DL
LEUKOCYTE ESTERASE UR QL STRIP.AUTO: NEGATIVE
MULTISTIX LOT#: ABNORMAL NUMERIC
NITRITE UR QL STRIP.AUTO: NEGATIVE
NITRITE, URINE: NEGATIVE
PH UR STRIP.AUTO: 6.5 [PH] (ref 5–8)
PH, URINE: 6.5 (ref 4.5–8)
PROT UR STRIP.AUTO-MCNC: NEGATIVE MG/DL
PROTEIN (URINE DIPSTICK): NEGATIVE MG/DL
RBC UR QL AUTO: NEGATIVE
SP GR UR STRIP.AUTO: 1.01 (ref 1–1.03)
SPECIFIC GRAVITY: 1.01 (ref 1–1.03)
URINE-COLOR: YELLOW
UROBILINOGEN UR STRIP.AUTO-MCNC: NORMAL MG/DL
UROBILINOGEN,SEMI-QN: 0.2 MG/DL (ref 0–1.9)

## 2024-01-24 PROCEDURE — 87086 URINE CULTURE/COLONY COUNT: CPT | Performed by: FAMILY MEDICINE

## 2024-01-24 PROCEDURE — 87624 HPV HI-RISK TYP POOLED RSLT: CPT | Performed by: FAMILY MEDICINE

## 2024-01-24 PROCEDURE — 88175 CYTOPATH C/V AUTO FLUID REDO: CPT | Performed by: FAMILY MEDICINE

## 2024-01-24 PROCEDURE — G0101 CA SCREEN;PELVIC/BREAST EXAM: HCPCS | Performed by: FAMILY MEDICINE

## 2024-01-24 PROCEDURE — 81003 URINALYSIS AUTO W/O SCOPE: CPT | Performed by: FAMILY MEDICINE

## 2024-01-24 PROCEDURE — 99213 OFFICE O/P EST LOW 20 MIN: CPT | Performed by: FAMILY MEDICINE

## 2024-01-24 NOTE — PROGRESS NOTES
Lay Michaels is a 68 year old female.  HPI:  Pt is here for medicare WWE  Postmenopausal, no abnormal vaginal discharge or spotting.  No pelvic pain.  History of bilateral oophorectomy, for benign reasons..  No h/o abnormal PAPs  ,  x 3  History of constipation.  Taking stool softener daily which usually works well.  Still with occasional flareups.  No rectal bleeding.  Had mild COVID infection last month. No residual sxs.  No cough or congestion.  Normal bladder  No dysuria.  No hematuria.  Parents with h/o renal Cancer.   Exercises regularly.  Diet usually healthy.  Notes some pain in R buttock on/off for about 4 months.notes symptoms primarily with bending toward L side.  If avoids disposition, then is okay.  No significant low back pain.  No radiation of pain down right lower extremity.  No lower extremity numbness or tingling.  No breast complaints.       Current Outpatient Medications   Medication Sig Dispense Refill    Cholecalciferol (VITAMIN D) 50 MCG ( UT) Oral Tab Take by mouth.      Vitamin B12 100 MCG Oral Tab Take 1 tablet (100 mcg total) by mouth daily.      Calcium 500-125 MG-UNIT Oral Tab Take by mouth.      Docusate Sodium (STOOL SOFTENER OR) Take by mouth daily.      Multiple Vitamin (DAILY VITAMINS) Oral Tab Take 1 tablet by mouth daily.           Past Medical History:   Diagnosis Date    Allergic rhinitis due to other allergen     Asthma     Back pain     Constipation     Family history of other cardiovascular diseases     H/O colonoscopy     Hemorrhoids     Leaking of urine     Tubular adenoma of colon     Urinary tract infection, site not specified     Wears glasses        Past Surgical History:   Procedure Laterality Date    COLONOSCOPY  2015    Tubular adenoma, recheck 5 years    COLONOSCOPY  2021    Diverticulosis, recheck 5 years          ORAL SURGERY      OTHER SURGICAL HISTORY  2008    C-scope  int hemorrhoids    OTHER SURGICAL HISTORY  2018     LAPAROSCOPIC BILATERAL SALPINGO OOPHORECTOMY WITH LEFT CYSTECTOMY         Social History     Socioeconomic History    Marital status:    Tobacco Use    Smoking status: Never     Passive exposure: Never    Smokeless tobacco: Never    Tobacco comments:     No tobacco use   Substance and Sexual Activity    Alcohol use: Yes     Alcohol/week: 4.0 standard drinks of alcohol     Types: 1 Glasses of wine, 3 Standard drinks or equivalent per week     Comment: Occasional     Drug use: No                   REVIEW OF SYSTEMS:  GENERAL HEALTH: feels well otherwise, mood is good  SKIN: denies any unusual skin lesions or rashes  RESPIRATORY: denies shortness of breath with exertion, no cough  CARDIOVASCULAR: denies chest pain on exertion  GI: denies abdominal pain and denies heartburn  : normal bladder  NEURO: denies headaches, denies dizziness    EXAM:  /66   Pulse 76   Temp 97 °F (36.1 °C) (Temporal)   Resp 16   Ht 5' 3\" (1.6 m)   Wt 156 lb (70.8 kg)   LMP 12/23/2013   SpO2 100%   BMI 27.63 kg/m²   Wt Readings from Last 6 Encounters:   01/24/24 156 lb (70.8 kg)   09/12/23 154 lb 4 oz (70 kg)   02/05/21 146 lb (66.2 kg)   11/03/20 151 lb (68.5 kg)   04/03/18 151 lb 3.8 oz (68.6 kg)   03/06/18 153 lb 6 oz (69.6 kg)     GENERAL: well developed, well nourished,in no apparent distress, pleasant affect  SKIN: no rashes,no suspicious lesions  HEENT: atraumatic, normocephalic,  Conj clear, EOMI  OMM, throat clear  NECK: supple,no adenopathy,noTM  LUNGS: clear to auscultation  CARDIO: RRR without murmur  GI: NABS, soft, no tenderness, no masses, no HSM, ND  EXTREMITIES: no cyanosis, clubbing or edema  NEURO: A&O x3, no focal deficits  Normal gait  No lumbar spinal or paraspinal tenderness.   Breast exam: Few scattered bilateral fibrocystic changes, symmetric, no dominant masses, no nipple or skin changes, no axillary or SC LAD   Pelvic: Mild external atrophic changes,.  Vaginal tissue prolapse noted, grade 2-3,.   No abnormal discharge.  Normal cervix w/o lesions, no CMT . With bimanual exam, R vaginal wall with palpable firmness/mass L vaginal wall normal.  Uterus and adnexa normal w/o masses or tenderness.    No anal or perianal lesions noted    ASSESSMENT AND PLAN:    1. Encounter for breast and pelvic examination  2. Encounter for Papanicolaou smear for cervical cancer screening  Pap smear done today.  Noted recent mammogram: WNL.  Schedule DEXA as previously ordered.   - ThinPrep PAP with HPV Reflex Request;   - Image-Guided Pap Smear ([facility])    3. Vaginal prolapse  4. Vaginal lesion  Patient with vaginal prolapse noted at introitus without associated symptoms.  Also with palpable firmness of right vaginal wall/possible mass.  No apparent bladder or cervical prolapse.  Has history of right uterine body fibroid with imaging done a few years ago.  Has history of bilateral oophorectomy/cystectomy.  Advised GYN evaluation.  - OBG Referral - In Network    5. Elevated serum creatinine  6. Family history of renal cancer  - Urine Dip, auto without Micro: Trace blood, trace LE.  Otherwise normal.  Slightly elevated creatinine at 1.17.  Slightly higher than previous.  Keep well-hydrated.  Check further urine testing as ordered.  Monitor.  Ck BMP in about 4  months  - Urine Culture, Routine [E]  - Urinalysis, Routine [E]    7. Personal history of colonic polyps  8. Family history of colon cancer  Up-to-date with colonoscopy    9. Other constipation  Keep well-hydrated.  Fiber supplement daily.  Continue stool softener daily.    10. Right buttock pain  Limit activities that exacerbate pain.  Local ice/heat, alternate as needed.  Topical analgesics as needed.  Follow-up if symptoms persist or worsen.    Other labs reviewed with normal vitamin D level.  Continue current supplement.  Fasting blood sugar normal.  Cholesterol panel with high HDL, 93.  Triglycerides normal.  LDL elevated at 131.  Monitor with diet and exercise.

## 2024-01-26 ENCOUNTER — TELEPHONE (OUTPATIENT)
Dept: FAMILY MEDICINE CLINIC | Facility: CLINIC | Age: 69
End: 2024-01-26

## 2024-01-26 NOTE — TELEPHONE ENCOUNTER
Patient states she is unable to get an appointment with gynecologist until 3/22/2023.  Please call office to see if we can facilitate a sooner appointment for patient in light of abnormal exam findings of possible vaginal mass.  Prefer patient to be evaluated soon.  (FYI, patient is scheduled to be out of town from 2/18/2023-3/10/2023.)

## 2024-01-26 NOTE — TELEPHONE ENCOUNTER
Called OB/GYN office to ask if there is any provider available to see patient sooner than 3/22/24 per request of Dr. Powell.  Advised patient has a possible vaginal mass.  States Dr. Moncada just had a cancellation on 2/2/24 at 11:30 am at the [facility].  They will schedule patient for that appt.  Requested we notify patient.     Brief VMML for patient to return call to our office.  Numbr given to return call.    Patient returned call.  Advised of cancellation at OB/GYN office and new appt scheduled for next Friday.    Future Appointments   Date Time Provider Department Center   2/2/2024 11:30 AM Balaji Moncada MD [facility]

## 2024-01-29 LAB
.: NORMAL
.: NORMAL

## 2024-01-30 LAB — HPV I/H RISK 1 DNA SPEC QL NAA+PROBE: NEGATIVE

## 2024-02-02 ENCOUNTER — OFFICE VISIT (OUTPATIENT)
Dept: OBGYN CLINIC | Facility: CLINIC | Age: 69
End: 2024-02-02
Payer: MEDICARE

## 2024-02-02 VITALS
BODY MASS INDEX: 27 KG/M2 | DIASTOLIC BLOOD PRESSURE: 72 MMHG | HEART RATE: 75 BPM | WEIGHT: 154 LBS | SYSTOLIC BLOOD PRESSURE: 118 MMHG

## 2024-02-02 DIAGNOSIS — N81.10 CYSTOCELE WITH RECTOCELE: Primary | ICD-10-CM

## 2024-02-02 DIAGNOSIS — N81.6 CYSTOCELE WITH RECTOCELE: Primary | ICD-10-CM

## 2024-02-02 PROCEDURE — 99203 OFFICE O/P NEW LOW 30 MIN: CPT | Performed by: OBSTETRICS & GYNECOLOGY

## 2024-02-02 NOTE — PROGRESS NOTES
[facility]  Obstetrics and Gynecology  Consultation History & Physical    Lay Michaels Patient Status:  No patient class for patient encounter    3/7/1955 MRN [id number]   Location [facility] - OB/GYN Attending No att. providers found   Hospital Day 0 PCP Vu Powell MD       Subjective:  Chief Complaint   Patient presents with    Other     Vaginal mass     68 year old female  presents for consultation requested by Dr. Vu Powell due to vaginal prolapse and possible right vaginal mass noted on exam.  Patient without significant symptoms related to prolapse.  Does have urinary urgency but tolerable.  Nocturia x 1-2 per night.  No significant incontinence.  No vaginal bleeding.    Patient's last menstrual period was 2013.       Last Pap smear: 2024     Abnormal Pap: n  Last Mammo:  2024       Most Recent Immunizations   Administered Date(s) Administered    Covid-19 Vaccine Moderna 100 mcg/0.5 ml 2021    Covid-19 Vaccine Moderna 50 Mcg/0.25 Ml 2021    Covid-19 Vaccine Moderna Bivalent 50mcg/0.5mL 12+ years 2022    FLU VAC High Dose 65 YRS & Older PRSV Free (97812) 2023    FLUZONE 6 months and older PFS 0.5 ml (13724) 10/07/2018    Flucelvax 0.5 Ml Quad PFS Single Dose 2020    Flulaval, 3 Years & >, IM 2020    Influenza 2020    Pneumococcal Conjugate PCV20 2023    TDAP 2018    Zoster Vaccine Recombinant Adjuvanted (Shingrix) 2019      reports that she has never smoked. She has never been exposed to tobacco smoke. She has never used smokeless tobacco.   reports current alcohol use of about 4.0 standard drinks of alcohol per week.    Past Medical History:   Diagnosis Date    Allergic rhinitis due to other allergen     Asthma     Back pain     Constipation     Family history of other cardiovascular diseases     H/O colonoscopy     Hemorrhoids     Leaking of urine     Tubular  adenoma of colon     Urinary tract infection, site not specified     Wears glasses      Past Surgical History:   Procedure Laterality Date    COLONOSCOPY  2015    Tubular adenoma, recheck 5 years    COLONOSCOPY  2021    Diverticulosis, recheck 5 years          ORAL SURGERY      OTHER SURGICAL HISTORY  2008    C-scope  int hemorrhoids    OTHER SURGICAL HISTORY  2018    LAPAROSCOPIC BILATERAL SALPINGO OOPHORECTOMY WITH LEFT CYSTECTOMY     Review of Systems:  Pertinent items are noted in the HPI.    Objective:  /72   Pulse 75   Wt 154 lb (69.9 kg)   LMP 2013   BMI 27.28 kg/m²    Physical Examination:  General appearance: Well dressed, well nourished in no apparent distress  Neurologic/Psychiatric: Alert and oriented to person, place and time, mood normal, affect appropriate  Abdomen: Soft, non-tender, non-distended, no masses, no hepatosplenomegaly, no hernias, no inguinal lymphadenopathy  Pelvic:    External genitalia- Normal, Bartholin's, urethra, skeins glands normal   Vagina- No vaginal lesions, no discharge   Cervix- No lesions, long/closed, no cervical motion tenderness   Uterus- Normal sized, anteverted, non-tender, no masses   Adnexa-  Non-tender, no masses  Grade 1-2 cystocele and grade 1-2 rectocele, mild sidewall relaxation  Rectum: No hemorrhoids, no masses    Assessment/Plan  68 year old female  presents today for consultation.  Exam reveals cystocele, rectocele, some vaginal sidewall relaxation.  No palpable right vaginal mass on exam today ? Stool in rectum.  Discussed treatment options for symptomatic prolapse.  Patient desires expectant management    Symptoms of overactive bladder.  Normal U/A and culture 24.  Discussed option of treatment with anti-spasmodic if symptoms become bothersome enough.  Patient declining treatment as this time.    Diagnoses and all orders for this visit:    Cystocele with rectocele       Cc to Vu Moncada  MD  [facility]- Obstetrics & Gynecology  Office [phone]

## 2024-07-30 ENCOUNTER — HOSPITAL ENCOUNTER (OUTPATIENT)
Dept: BONE DENSITY | Age: 69
Discharge: HOME | End: 2024-07-30
Attending: FAMILY MEDICINE
Payer: MEDICARE

## 2024-07-30 DIAGNOSIS — Z78.0 POSTMENOPAUSAL: ICD-10-CM

## 2024-07-30 PROCEDURE — 77080 DXA BONE DENSITY AXIAL: CPT | Performed by: FAMILY MEDICINE

## 2024-09-11 ENCOUNTER — OFFICE VISIT (OUTPATIENT)
Dept: FAMILY MEDICINE CLINIC | Facility: CLINIC | Age: 69
End: 2024-09-11
Payer: MEDICARE

## 2024-09-11 VITALS
DIASTOLIC BLOOD PRESSURE: 78 MMHG | RESPIRATION RATE: 16 BRPM | TEMPERATURE: 97 F | HEIGHT: 63 IN | OXYGEN SATURATION: 99 % | SYSTOLIC BLOOD PRESSURE: 132 MMHG | HEART RATE: 94 BPM | WEIGHT: 147 LBS | BODY MASS INDEX: 26.05 KG/M2

## 2024-09-11 DIAGNOSIS — Z82.49 FAMILY HISTORY OF ABDOMINAL AORTIC ANEURYSM (AAA): ICD-10-CM

## 2024-09-11 DIAGNOSIS — R94.31 LEFT AXIS DEVIATION: ICD-10-CM

## 2024-09-11 DIAGNOSIS — I44.7 LBBB (LEFT BUNDLE BRANCH BLOCK): ICD-10-CM

## 2024-09-11 DIAGNOSIS — M85.89 OSTEOPENIA OF MULTIPLE SITES: ICD-10-CM

## 2024-09-11 DIAGNOSIS — Z00.00 ENCOUNTER FOR ANNUAL HEALTH EXAMINATION: Primary | ICD-10-CM

## 2024-09-11 DIAGNOSIS — E55.9 VITAMIN D DEFICIENCY: ICD-10-CM

## 2024-09-11 DIAGNOSIS — Z12.31 VISIT FOR SCREENING MAMMOGRAM: ICD-10-CM

## 2024-09-11 DIAGNOSIS — R06.09 DOE (DYSPNEA ON EXERTION): ICD-10-CM

## 2024-09-11 DIAGNOSIS — E78.5 DYSLIPIDEMIA: ICD-10-CM

## 2024-09-11 DIAGNOSIS — N18.31 STAGE 3A CHRONIC KIDNEY DISEASE (HCC): ICD-10-CM

## 2024-09-11 DIAGNOSIS — Z80.0 FAMILY HISTORY OF COLON CANCER: ICD-10-CM

## 2024-09-11 DIAGNOSIS — Z82.49 FAMILY HISTORY OF VALVULAR HEART DISEASE: ICD-10-CM

## 2024-09-11 LAB
ATRIAL RATE: 71 BPM
P AXIS: 75 DEGREES
P-R INTERVAL: 134 MS
Q-T INTERVAL: 442 MS
QRS DURATION: 122 MS
QTC CALCULATION (BEZET): 480 MS
R AXIS: -53 DEGREES
T AXIS: 94 DEGREES
VENTRICULAR RATE: 71 BPM

## 2024-09-11 PROCEDURE — 96160 PT-FOCUSED HLTH RISK ASSMT: CPT | Performed by: FAMILY MEDICINE

## 2024-09-11 PROCEDURE — 93000 ELECTROCARDIOGRAM COMPLETE: CPT | Performed by: FAMILY MEDICINE

## 2024-09-11 PROCEDURE — 99213 OFFICE O/P EST LOW 20 MIN: CPT | Performed by: FAMILY MEDICINE

## 2024-09-11 PROCEDURE — G0438 PPPS, INITIAL VISIT: HCPCS | Performed by: FAMILY MEDICINE

## 2024-09-11 NOTE — PROGRESS NOTES
Subjective:   Lay Michaels is a 69 year old female who presents for a MA AHA (Medicare Advantage Annual Health Assessment) and scheduled follow up of multiple significant but stable problems.     Pt is doing well overall  Diet is healthy.   Exercising regularly with video workout daily  Lost about 10 pounds over the year with TLCs  No unusual fatigue.   Taking Vitamin D supplement daily  Takes stool softener. Has BM every other day  Saw gyne after last ov and dx with cystocele /rectocele  No sig associated sxs.  Some UI at times  1x nocturia, no dysuria.   No pelvic pain.  No abnormal discharge.  Denies any chest pain.  Inconsistent dyspnea on exertion at times.  No palpitations.  No cough or congestion.  No lightheadedness or dizziness.  Father with valvular heart disease and CHF/emphysema/AAA  No breast complaints.  Sleep okay.  Vision stable.  Hearing good.    History/Other:   Fall Risk Assessment:   She has been screened for Falls and is low risk.      Cognitive Assessment:   Abnormal  What day of the week is this?: Correct  What month is it?: Correct  What year is it?: Correct  Recall \"Ball\": Correct  Recall \"Flag\": Correct  Recall \"Tree\": Incorrect    Functional Ability/Status:   Lay Michaels has some abnormal functions as listed below:  She has Vision problems based on screening of functional status.       Depression Screening (PHQ):  PHQ-2 SCORE: 0  , done 9/11/2024         Advanced Directives:   She does NOT have a Living Will. [Do you have a living will?: Yes]    She does NOT have a Power of  for Health Care. [Do you have a healthcare power of ?: Yes]    Discussed Advance Care Planning with patient (and family/surrogate if present). Standard forms made available to patient in After Visit Summary.      Patient Active Problem List   Diagnosis    Tubular adenoma of colon    Family history of colon cancer    Family history of colonic polyps    Personal history of colonic polyps    Cystocele with  rectocele    Stage 3a chronic kidney disease (HCC)     Allergies:  She is allergic to penicillin v potassium [penicillin v] and penicillins.    Current Medications:  Outpatient Medications Marked as Taking for the 24 encounter (Office Visit) with Vu Powell MD   Medication Sig    Cholecalciferol (VITAMIN D) 50 MCG (2000 UT) Oral Tab Take by mouth.    Vitamin B12 100 MCG Oral Tab Take 1 tablet (100 mcg total) by mouth daily.    Calcium 500-125 MG-UNIT Oral Tab Take by mouth.    Docusate Sodium (STOOL SOFTENER OR) Take by mouth daily.    Multiple Vitamin (DAILY VITAMINS) Oral Tab Take 1 tablet by mouth daily.       Medical History:  She  has a past medical history of Allergic rhinitis, Allergic rhinitis due to other allergen, Asthma (HCC), Back pain, Constipation, Family history of other cardiovascular diseases, H/O colonoscopy, Hemorrhoids, Leaking of urine, Tubular adenoma of colon, Urinary tract infection, site not specified, and Wears glasses.  Surgical History:  She  has a past surgical history that includes oral surgery; colonoscopy (2015); other surgical history (2008); other surgical history (2018); colonoscopy (2021); and .   Family History:  Her family history includes AAA in her father; Alcohol and Other Disorders Associated in her brother; Bleeding Disorders in her sister; Cancer in her father and mother; Colon Cancer in her paternal aunt and paternal uncle; Colon Polyps in her sister; Crohn's Disease in her brother and sister; Diverticulitis in her mother; HTN in her father and mother; Mitral Valve Replacement in her father; Stroke in her paternal grandmother; Ulcerative Colitis in her sister and son.  Social History:  She  reports that she has never smoked. She has never been exposed to tobacco smoke. She has never used smokeless tobacco. She reports current alcohol use of about 5.0 standard drinks of alcohol per week. She reports that she does not use  drugs.    Tobacco:       CAGE Alcohol Screen:   CAGE screening score of 0 on 9/11/2024, showing low risk of alcohol abuse.      Patient Care Team:  Vu Powell MD as PCP - General (Family Practice)    Review of Systems  GENERAL: feels well otherwise  SKIN: denies any unusual skin lesions  EYES: denies blurred vision or double vision  HEENT: denies nasal congestion, sinus pain or ST  LUNGS: As above  CARDIOVASCULAR: denies chest pain on exertion  GI: denies abdominal pain, denies heartburn  : As above      MUSCULOSKELETAL: denies back pain  NEURO: denies headaches  PSYCHE: denies depression or anxiety  HEMATOLOGIC: denies hx of anemia  ENDOCRINE: denies thyroid history  ALL/ASTHMA: denies hx of allergy or asthma    Objective:   Physical Exam  GENERAL: well developed, well nourished,in no apparent distress, pleasant affect  SKIN: no rashes  Multiple benign appearing nevi  No suspicious lesions  HEENT: atraumatic, normocephalic,  Conj clear, EOMI, PERRL  TMs: Clear bilaterally  OMM, throat clear  NECK: supple,no adenopathy,noTM, no bruits, no JVD  LUNGS: clear to auscultation  CARDIO: RRR without murmur, normal S1, S2, no ectopy  GI: NABS, soft, no tenderness, no masses, no HSM, ND  EXTREMITIES: no cyanosis, clubbing or edema  NEURO: A&O x3, no focal deficits  Normal gait  Breasts: Symmetric, few scattered fibrocystic changes bilaterally, no dominant masses, no nipple or skin changes, no axillary or supraclavicular lymphadenopathy.    /78   Pulse 94   Temp 97.3 °F (36.3 °C) (Temporal)   Resp 16   Ht 5' 3\" (1.6 m)   Wt 147 lb (66.7 kg)   LMP 12/23/2013   SpO2 99%   BMI 26.04 kg/m²  Estimated body mass index is 26.04 kg/m² as calculated from the following:    Height as of this encounter: 5' 3\" (1.6 m).    Weight as of this encounter: 147 lb (66.7 kg).    Medicare Hearing Assessment:   Hearing Screening    Time taken: 9/11/2024  1:30 PM  Entry User: Vu Powell MD  Screening Method: Finger  Rub  Finger Rub Result: Pass         Visual Acuity:   Right Eye Visual Acuity: Uncorrected Right Eye Chart Acuity: 20/40   Left Eye Visual Acuity: Uncorrected Left Eye Chart Acuity: 20/40   Both Eyes Visual Acuity: Uncorrected Both Eyes Chart Acuity: 20/30   Able To Tolerate Visual Acuity: Yes        Assessment & Plan:   Lay Michaels is a 69 year old female who presents for a Medicare Assessment.     1. Encounter for annual health examination (Primary)  Reviewed diet/exercise/skin care/safety/fall precautions/activities to keep cognition strong  Reviewed personal preventive plan services  Advised formal eye exam annually  Immunizations Reviewed: Tdap up-to-date.  Discussed COVID-19 booster dose and RSV vaccine  Advised annual flu vaccine seasonally.  Up-to-date with pneumococcal vaccine and Shingrix vaccine series  Colonoscopy up-to-date  2. Visit for screening mammogram  -     [facility] CHRIS 2D+3D SCREENING BILAT (CPT=77067/54319); Future; Expected date: 01/06/2025  3. Osteopenia of multiple sites  Reviewed calcium intake/vitamin D supplementation/weightbearing exercises  Reviewed DEXA scan 7/2024.  Recheck due 7/2026  -     CBC With Differential With Platelet  -     Comp Metabolic Panel (14)  -     Vitamin D, 25-Hydroxy  4. Family history of valvular heart disease  -     EKG with interpretation and Report -IN OFFICE [74248]: Normal sinus rhythm, possible LAE, LAD, LBBB,   No previous EKGs for comparison  -     CARD ECHO 2D DOPPLER (CPT=93306); Future; Expected date: 09/11/2024  5. MCKEON (dyspnea on exertion)  Symptoms mild, intermittent.  Monitor.  -     EKG with interpretation and Report -IN OFFICE [37802]: As above  -     CARD ECHO 2D DOPPLER (CPT=93306); Future; Expected date: 09/11/2024  6. LBBB (left bundle branch block)  -     CARD ECHO 2D DOPPLER (CPT=93306); Future; Expected date: 09/11/2024  -     CBC With Differential With Platelet  -     Comp Metabolic Panel (14)  -     Magnesium  7. Left axis deviation  -      CARD ECHO 2D DOPPLER (CPT=93306); Future; Expected date: 09/11/2024  8. Family history of abdominal aortic aneurysm (AAA)  -     US ABDOMINAL AORTIC ANEURYSM SCREENING (CPT=76706); Future; Expected date: 09/11/2024  9. Family history of colon cancer  Colonoscopy up-to-date  10. Stage 3a chronic kidney disease (HCC)  Creatinine slightly elevated and GFR slightly low with previous labs  Keep well-hydrated.  Blood pressure controlled.  Monitor.  -     CBC With Differential With Platelet  -     Comp Metabolic Panel (14)  11. Dyslipidemia  Monitor with diet and exercise  -     Lipid Panel  -     TSH W Reflex To Free T4  12. Vitamin D deficiency  Continue current supplement  -     Comp Metabolic Panel (14)  -     Vitamin D, 25-Hydroxy  The patient indicates understanding of these issues and agrees to the plan.  Reinforced healthy diet, lifestyle, and exercise.      Return in about 2 months (around 11/11/2024) for Follow-up Test Results.     Vu Powell MD, 9/11/2024     Supplementary Documentation:   General Health:  In the past six months, have you lost more than 10 pounds without trying?: 2 - No  Has your appetite been poor?: No  Type of Diet: Balanced  How does the patient maintain a good energy level?: Appropriate Exercise;Daily Walks  How would you describe your daily physical activity?: Moderate  How would you describe your current health state?: Good  How do you maintain positive mental well-being?: Social Interaction;Games;Visiting Friends;Visiting Family  On a scale of 0 to 10, with 0 being no pain and 10 being severe pain, what is your pain level?: 0 - (None)  In the past six months, have you experienced urine leakage?: 1-Yes  At any time do you feel concerned for the safety/well-being of yourself and/or your children, in your home or elsewhere?: No  Have you had any immunizations at another office such as Influenza, Hepatitis B, Tetanus, or Pneumococcal?: No    Health Maintenance   Topic Date Due    MA  Annual Health Assessment  01/01/2024    COVID-19 Vaccine (5 - 2023-24 season) 09/01/2024    Influenza Vaccine (1) 10/01/2024    Mammogram  01/06/2025    Colorectal Cancer Screening  02/09/2026    DEXA Scan  Completed    Annual Depression Screening  Completed    Fall Risk Screening (Annual)  Completed    Pneumococcal Vaccine: 65+ Years  Completed    Zoster Vaccines  Completed

## 2024-10-05 PROBLEM — N18.31 STAGE 3A CHRONIC KIDNEY DISEASE (HCC): Status: ACTIVE | Noted: 2024-10-05

## 2024-10-25 ENCOUNTER — HOSPITAL ENCOUNTER (OUTPATIENT)
Dept: CV DIAGNOSTICS | Age: 69
Discharge: HOME | End: 2024-10-25
Attending: FAMILY MEDICINE
Payer: MEDICARE

## 2024-10-25 ENCOUNTER — HOSPITAL ENCOUNTER (OUTPATIENT)
Dept: ULTRASOUND IMAGING | Age: 69
Discharge: HOME | End: 2024-10-25
Attending: FAMILY MEDICINE
Payer: MEDICARE

## 2024-10-25 DIAGNOSIS — Z82.49 FAMILY HISTORY OF VALVULAR HEART DISEASE: ICD-10-CM

## 2024-10-25 DIAGNOSIS — I44.7 LBBB (LEFT BUNDLE BRANCH BLOCK): ICD-10-CM

## 2024-10-25 DIAGNOSIS — Z82.49 FAMILY HISTORY OF ABDOMINAL AORTIC ANEURYSM (AAA): ICD-10-CM

## 2024-10-25 DIAGNOSIS — R06.09 DOE (DYSPNEA ON EXERTION): ICD-10-CM

## 2024-10-25 DIAGNOSIS — R94.31 LEFT AXIS DEVIATION: ICD-10-CM

## 2024-10-25 PROCEDURE — 93306 TTE W/DOPPLER COMPLETE: CPT | Performed by: FAMILY MEDICINE

## 2024-10-25 PROCEDURE — 76706 US ABDL AORTA SCREEN AAA: CPT | Performed by: FAMILY MEDICINE

## 2025-01-17 ENCOUNTER — TELEPHONE (OUTPATIENT)
Dept: FAMILY MEDICINE CLINIC | Facility: CLINIC | Age: 70
End: 2025-01-17

## 2025-01-17 DIAGNOSIS — Z01.00 EYE EXAM, ROUTINE: ICD-10-CM

## 2025-01-17 DIAGNOSIS — E55.9 VITAMIN D2 DEFICIENCY: ICD-10-CM

## 2025-01-17 DIAGNOSIS — E55.9 VITAMIN D DEFICIENCY: ICD-10-CM

## 2025-01-17 DIAGNOSIS — N18.31 STAGE 3A CHRONIC KIDNEY DISEASE (HCC): ICD-10-CM

## 2025-01-17 DIAGNOSIS — E78.5 HYPERLIPIDEMIA, UNSPECIFIED HYPERLIPIDEMIA TYPE: ICD-10-CM

## 2025-01-17 DIAGNOSIS — M85.80 OSTEOPENIA, UNSPECIFIED LOCATION: ICD-10-CM

## 2025-01-17 DIAGNOSIS — M85.89 OSTEOPENIA OF MULTIPLE SITES: ICD-10-CM

## 2025-01-17 DIAGNOSIS — I44.7 COMPLETE LEFT BUNDLE BRANCH BLOCK: Primary | ICD-10-CM

## 2025-01-17 NOTE — TELEPHONE ENCOUNTER
Patient calling would like labs changed to edward.       Also needing a new/updated referral for her ophthalmologist, Dr. Oppenheim.     Please advise

## 2025-01-21 NOTE — TELEPHONE ENCOUNTER
Spoke to pt. Informed her that the referral has been entered. Inquired if she needs the referral faxed to their office. She said she doesn't know. She will call them to schedule appt and find out then.

## 2025-02-25 ENCOUNTER — LAB ENCOUNTER (OUTPATIENT)
Dept: LAB | Age: 70
End: 2025-02-25
Attending: FAMILY MEDICINE
Payer: MEDICARE

## 2025-02-25 ENCOUNTER — HOSPITAL ENCOUNTER (OUTPATIENT)
Dept: MAMMOGRAPHY | Age: 70
Discharge: HOME | End: 2025-02-25
Attending: FAMILY MEDICINE
Payer: MEDICARE

## 2025-02-25 DIAGNOSIS — E55.9 VITAMIN D2 DEFICIENCY: ICD-10-CM

## 2025-02-25 DIAGNOSIS — E78.5 HYPERLIPIDEMIA, UNSPECIFIED HYPERLIPIDEMIA TYPE: ICD-10-CM

## 2025-02-25 DIAGNOSIS — M85.89 OSTEOPENIA OF MULTIPLE SITES: ICD-10-CM

## 2025-02-25 DIAGNOSIS — M85.80 OSTEOPENIA, UNSPECIFIED LOCATION: ICD-10-CM

## 2025-02-25 DIAGNOSIS — E55.9 VITAMIN D DEFICIENCY: ICD-10-CM

## 2025-02-25 DIAGNOSIS — Z12.31 VISIT FOR SCREENING MAMMOGRAM: ICD-10-CM

## 2025-02-25 DIAGNOSIS — I44.7 COMPLETE LEFT BUNDLE BRANCH BLOCK: ICD-10-CM

## 2025-02-25 DIAGNOSIS — N18.31 STAGE 3A CHRONIC KIDNEY DISEASE (HCC): ICD-10-CM

## 2025-02-25 LAB
ALBUMIN SERPL-MCNC: 4.9 G/DL (ref 3.2–4.8)
ALBUMIN/GLOB SERPL: 1.5 {RATIO} (ref 1–2)
ALP LIVER SERPL-CCNC: 85 U/L
ALT SERPL-CCNC: 18 U/L
ANION GAP SERPL CALC-SCNC: 9 MMOL/L (ref 0–18)
AST SERPL-CCNC: 29 U/L (ref ?–34)
BASOPHILS # BLD AUTO: 0.04 X10(3) UL (ref 0–0.2)
BASOPHILS NFR BLD AUTO: 0.9 %
BILIRUB SERPL-MCNC: 0.6 MG/DL (ref 0.2–1.1)
BUN BLD-MCNC: 13 MG/DL (ref 9–23)
CALCIUM BLD-MCNC: 10.4 MG/DL (ref 8.7–10.6)
CHLORIDE SERPL-SCNC: 105 MMOL/L (ref 98–112)
CHOLEST SERPL-MCNC: 239 MG/DL (ref ?–200)
CO2 SERPL-SCNC: 30 MMOL/L (ref 21–32)
CREAT BLD-MCNC: 1.19 MG/DL
EGFRCR SERPLBLD CKD-EPI 2021: 50 ML/MIN/1.73M2 (ref 60–?)
EOSINOPHIL # BLD AUTO: 0.1 X10(3) UL (ref 0–0.7)
EOSINOPHIL NFR BLD AUTO: 2.2 %
ERYTHROCYTE [DISTWIDTH] IN BLOOD BY AUTOMATED COUNT: 13.4 %
FASTING PATIENT LIPID ANSWER: YES
FASTING STATUS PATIENT QL REPORTED: YES
GLOBULIN PLAS-MCNC: 3.3 G/DL (ref 2–3.5)
GLUCOSE BLD-MCNC: 88 MG/DL (ref 70–99)
HCT VFR BLD AUTO: 39.9 %
HDLC SERPL-MCNC: 91 MG/DL (ref 40–59)
HGB BLD-MCNC: 13.1 G/DL
IMM GRANULOCYTES # BLD AUTO: 0.01 X10(3) UL (ref 0–1)
IMM GRANULOCYTES NFR BLD: 0.2 %
LDLC SERPL CALC-MCNC: 133 MG/DL (ref ?–100)
LYMPHOCYTES # BLD AUTO: 1.33 X10(3) UL (ref 1–4)
LYMPHOCYTES NFR BLD AUTO: 28.9 %
MAGNESIUM SERPL-MCNC: 2.1 MG/DL (ref 1.6–2.6)
MCH RBC QN AUTO: 29.8 PG (ref 26–34)
MCHC RBC AUTO-ENTMCNC: 32.8 G/DL (ref 31–37)
MCV RBC AUTO: 90.9 FL
MONOCYTES # BLD AUTO: 0.27 X10(3) UL (ref 0.1–1)
MONOCYTES NFR BLD AUTO: 5.9 %
NEUTROPHILS # BLD AUTO: 2.85 X10 (3) UL (ref 1.5–7.7)
NEUTROPHILS # BLD AUTO: 2.85 X10(3) UL (ref 1.5–7.7)
NEUTROPHILS NFR BLD AUTO: 61.9 %
NONHDLC SERPL-MCNC: 148 MG/DL (ref ?–130)
OSMOLALITY SERPL CALC.SUM OF ELEC: 298 MOSM/KG (ref 275–295)
PLATELET # BLD AUTO: 168 10(3)UL (ref 150–450)
POTASSIUM SERPL-SCNC: 4.5 MMOL/L (ref 3.5–5.1)
PROT SERPL-MCNC: 8.2 G/DL (ref 5.7–8.2)
RBC # BLD AUTO: 4.39 X10(6)UL
SODIUM SERPL-SCNC: 144 MMOL/L (ref 136–145)
T4 FREE SERPL-MCNC: 1 NG/DL (ref 0.8–1.7)
TRIGL SERPL-MCNC: 90 MG/DL (ref 30–149)
TSI SER-ACNC: 1.43 UIU/ML (ref 0.55–4.78)
VIT D+METAB SERPL-MCNC: 48.8 NG/ML (ref 30–100)
VLDLC SERPL CALC-MCNC: 16 MG/DL (ref 0–30)
WBC # BLD AUTO: 4.6 X10(3) UL (ref 4–11)

## 2025-02-25 PROCEDURE — 80053 COMPREHEN METABOLIC PANEL: CPT

## 2025-02-25 PROCEDURE — 77067 SCR MAMMO BI INCL CAD: CPT | Performed by: FAMILY MEDICINE

## 2025-02-25 PROCEDURE — 84439 ASSAY OF FREE THYROXINE: CPT

## 2025-02-25 PROCEDURE — 77063 BREAST TOMOSYNTHESIS BI: CPT | Performed by: FAMILY MEDICINE

## 2025-02-25 PROCEDURE — 83735 ASSAY OF MAGNESIUM: CPT

## 2025-02-25 PROCEDURE — 36415 COLL VENOUS BLD VENIPUNCTURE: CPT

## 2025-02-25 PROCEDURE — 84443 ASSAY THYROID STIM HORMONE: CPT

## 2025-02-25 PROCEDURE — 82306 VITAMIN D 25 HYDROXY: CPT

## 2025-02-25 PROCEDURE — 85025 COMPLETE CBC W/AUTO DIFF WBC: CPT

## 2025-02-25 PROCEDURE — 80061 LIPID PANEL: CPT

## 2025-03-17 ENCOUNTER — HOSPITAL ENCOUNTER (OUTPATIENT)
Dept: ULTRASOUND IMAGING | Age: 70
Discharge: HOME | End: 2025-03-17
Attending: FAMILY MEDICINE
Payer: MEDICARE

## 2025-03-17 ENCOUNTER — TELEPHONE (OUTPATIENT)
Dept: FAMILY MEDICINE CLINIC | Facility: CLINIC | Age: 70
End: 2025-03-17

## 2025-03-17 ENCOUNTER — HOSPITAL ENCOUNTER (OUTPATIENT)
Dept: MAMMOGRAPHY | Age: 70
Discharge: HOME | End: 2025-03-17
Attending: FAMILY MEDICINE
Payer: MEDICARE

## 2025-03-17 DIAGNOSIS — R92.2 INCONCLUSIVE MAMMOGRAM: ICD-10-CM

## 2025-03-17 PROCEDURE — 77065 DX MAMMO INCL CAD UNI: CPT | Performed by: FAMILY MEDICINE

## 2025-03-17 PROCEDURE — 77061 BREAST TOMOSYNTHESIS UNI: CPT | Performed by: FAMILY MEDICINE

## 2025-03-17 PROCEDURE — 76642 ULTRASOUND BREAST LIMITED: CPT | Performed by: FAMILY MEDICINE

## 2025-03-17 NOTE — TELEPHONE ENCOUNTER
See 3/17/25 Imaging US Breast Left Limited. Radiology calling for orders for biopsy of left breast. Looks like orders are in and appt already scheduled.    Future Appointments         Provider Department Appt Notes    In 1 week [facility] BREAST [facility] Mammography

## 2025-03-17 NOTE — IMAGING NOTE
This Breast Care RN assisted Dr. Stromberg with recommendation for a left breast 1 site ultrasound guided biopsy for nodule.  Procedure reviewed and all questions answered. Emotional and educational support given. On the day of the biopsy, pt instructed to take Tylenol 1000mg PO, eat a light meal & bring or wear a sports bra.  Post biopsy care also reviewed with pt to include NO lifting more than 5lbs, no exercising or housework (limit upper body movement) for 24-48 hrs post biopsy. Patient denies blood thinners, bleeding disorders, liver disease, and chemo. Pt verbalized understanding. The patient has been scheduled for 3-24-25 at 9:30, arriving at 9 am.

## 2025-03-24 ENCOUNTER — HOSPITAL ENCOUNTER (OUTPATIENT)
Dept: MAMMOGRAPHY | Facility: HOSPITAL | Age: 70
Discharge: HOME | End: 2025-03-24
Attending: FAMILY MEDICINE
Payer: MEDICARE

## 2025-03-24 DIAGNOSIS — N63.0 BREAST NODULE: ICD-10-CM

## 2025-03-24 DIAGNOSIS — N64.89 BREAST ASYMMETRY: ICD-10-CM

## 2025-03-24 DIAGNOSIS — R92.8 ABNORMAL FINDINGS ON DIAGNOSTIC IMAGING OF BREAST: ICD-10-CM

## 2025-03-24 PROCEDURE — 19083 BX BREAST 1ST LESION US IMAG: CPT | Performed by: FAMILY MEDICINE

## 2025-03-24 PROCEDURE — 77065 DX MAMMO INCL CAD UNI: CPT | Performed by: FAMILY MEDICINE

## 2025-03-24 PROCEDURE — 88305 TISSUE EXAM BY PATHOLOGIST: CPT | Performed by: FAMILY MEDICINE

## 2025-03-24 PROCEDURE — 88344 IMHCHEM/IMCYTCHM EA MLT ANTB: CPT | Performed by: FAMILY MEDICINE

## 2025-03-24 PROCEDURE — 88341 IMHCHEM/IMCYTCHM EA ADD ANTB: CPT | Performed by: FAMILY MEDICINE

## 2025-03-24 PROCEDURE — 88342 IMHCHEM/IMCYTCHM 1ST ANTB: CPT | Performed by: FAMILY MEDICINE

## 2025-03-24 PROCEDURE — 88360 TUMOR IMMUNOHISTOCHEM/MANUAL: CPT | Performed by: FAMILY MEDICINE

## 2025-03-24 NOTE — DISCHARGE INSTRUCTIONS
Discharge Instructions-Ordering Provider  Stereotactic / Ultrasound / MRI Core Biopsy       Place an ice pack on top of the biopsy site in your bra OR the folds of the Ace wrap for 10-15 minutes every hour until bedtime for your comfort and to decrease bleeding.     Keep your supportive bra OR the Ace wrap in place for 24 hours after your biopsy. This compression decreases bleeding and breast movement for your comfort. Wear a supportive bra for the next couple days for comfort (as well as for sleeping)     No bath or shower during the first 24 hours after biopsy. After this time, you may take a bath or shower. It's okay if the steri-strips get wet but don't soak them.   No saunas, hot tubs, or swimming pools until the steri-strips fall off (5-7days).  This prevents infection and allows time for the area to completely close and heal.     DO NOT remove the steri-strips. They will fall off in 5 days to two weeks. If any type of irritation (redness, itching, OR blisters) develops in the area around the steri-strips, remove them gently. Keep the area clean and dry.     It is normal to have mild discomfort and bruising at the biopsy site.      You may take Tylenol as needed for discomfort.     DO NOT participate in strenuous activity (aerobics, heavy lifting, housework, gardening, etc.) 48 hours after your biopsy to prevent bleeding.     You will receive results from your ordering provider. Please contact them with any questions.    If you have any questions about the procedure, please contact the Breast Care Coordinator Nurse at [phone]. (M-F 7:30-4)    If you are having a MRI Breast Biopsy or an Ultrasound guided biopsy, you will be billed for the biopsy and a unilateral mammogram separately.    A 6-month or one year follow-up Diagnostic Mammogram/Ultrasound will be recommended to document stability of the biopsy site. It may be scheduled at [facility] or [facility] by calling [phone].  You will need an order for this exam from your referring physician.       5/2024

## 2025-03-26 NOTE — IMAGING NOTE
Called Dr Powell's office and spoke to RN re: possible breast surgical referral  Dr Powell referring patient to Dr Johnson or Dr Bob if results are positive.. Breast biopsy done 3-24-25 and pathology still pending.

## 2025-03-27 ENCOUNTER — TELEPHONE (OUTPATIENT)
Age: 70
End: 2025-03-27

## 2025-03-27 ENCOUNTER — PATIENT MESSAGE (OUTPATIENT)
Facility: CLINIC | Age: 70
End: 2025-03-27

## 2025-03-27 NOTE — TELEPHONE ENCOUNTER
Phoned patient and we discussed newly diagnosed breast cancer. Introduced myself as one of the breast nurse navigators and explained the role of the breast nurse navigator. Explained the role of the physicians on her breast cancer care team. Reviewed over pathology report and discussed the type of breast cancer, grade, and ER/VA and Her 2. Briefly discussed breast cancer treatments including surgery, radiation, hormone therapy, and chemotherapy. Explained the breast cancer multidisciplinary meeting and that her case will be discussed. Discussed the breast cancer treatment handbook and at their upcoming appointment this resource will be provided. Discussed with the patient that Dr. Powell suggested the patient to consult with Dr. Johnson and offered an appointment with Dr. Johnson on Friday April 4, 2025 at 0945 in [place]. Patient confirmed the appointment with Dr. Johnson and stated she used to work at [employer] and has a good relationship with Dr. Powell and appreciated her help. Patient given my contact information to call with any further questions or concerns.

## 2025-03-27 NOTE — IMAGING NOTE
Spoke with Lay Michaels and name,  verified with patient. Notified Lay Michaels of positive for invasive ductal carcinoma biopsy result. Concordance pending. Lay Michaels reports biopsy site is healing well. Hematoma management discussed. Appointment times with Dr Bob and Dr Johnson offered. Patient changed to appointment on  at 0945 with Dr Johnson. Confirmed with breast nurse navigators. All contact information for Dr Johnson, nurse navigators and myself given to patient. Pt verbalized understanding and had no further questions at this time.

## 2025-03-28 ENCOUNTER — TELEPHONE (OUTPATIENT)
Age: 70
End: 2025-03-28

## 2025-03-28 NOTE — TELEPHONE ENCOUNTER
Called patient to review her breast biopsy tumor markers that resulted. Answered her questions to the best of my ability. She thanked me for the phone call and assistance.

## 2025-04-04 ENCOUNTER — OFFICE VISIT (OUTPATIENT)
Dept: SURGERY | Facility: CLINIC | Age: 70
End: 2025-04-04
Payer: MEDICARE

## 2025-04-04 ENCOUNTER — TELEPHONE (OUTPATIENT)
Dept: SURGERY | Facility: CLINIC | Age: 70
End: 2025-04-04

## 2025-04-04 ENCOUNTER — NURSE NAVIGATOR ENCOUNTER (OUTPATIENT)
Age: 70
End: 2025-04-04

## 2025-04-04 VITALS
TEMPERATURE: 98 F | HEART RATE: 60 BPM | HEIGHT: 63 IN | RESPIRATION RATE: 16 BRPM | DIASTOLIC BLOOD PRESSURE: 84 MMHG | SYSTOLIC BLOOD PRESSURE: 164 MMHG | WEIGHT: 147 LBS | OXYGEN SATURATION: 100 % | BODY MASS INDEX: 26.05 KG/M2

## 2025-04-04 DIAGNOSIS — C50.919 INVASIVE CARCINOMA OF BREAST (HCC): Primary | ICD-10-CM

## 2025-04-04 PROCEDURE — 1126F AMNT PAIN NOTED NONE PRSNT: CPT | Performed by: SURGERY

## 2025-04-04 PROCEDURE — 99205 OFFICE O/P NEW HI 60 MIN: CPT | Performed by: SURGERY

## 2025-04-04 PROCEDURE — 1159F MED LIST DOCD IN RCRD: CPT | Performed by: SURGERY

## 2025-04-04 PROCEDURE — 1160F RVW MEDS BY RX/DR IN RCRD: CPT | Performed by: SURGERY

## 2025-04-04 RX ORDER — KETOROLAC TROMETHAMINE 5 MG/ML
1 SOLUTION OPHTHALMIC 4 TIMES DAILY
COMMUNITY
Start: 2025-04-03

## 2025-04-04 RX ORDER — B-COMPLEX WITH VITAMIN C
1 CAPSULE ORAL DAILY
COMMUNITY
Start: 2025-03-01

## 2025-04-04 NOTE — TELEPHONE ENCOUNTER
Calling pt in regards to scheduling surgery.  Informed pt that I have 05/01/2025 available at [facility] with Dr. Johnson.  Pt verbalized understanding and in agreement with date and location.  All questions answered.   Encouraged pt to call or [facility] message office with any other questions or concerns.

## 2025-04-04 NOTE — PROGRESS NOTES
Met with patient in Dr. Johnson's clinic. Introduced myself as one the breast nurse navigators and explained the role of the breast nurse navigator and coordination of care. Explained the role of all of the physicians involved in her care including the surgeon, medical oncologist, radiation oncologist, and possibly plastic surgeon. Reviewed over the patients pathology report and discussed receptors including ER/MT/ and Her 2. Patient was given the breast cancer treatment handbook and reviewed over how to use this resource. Discussed the breast multidisciplinary conference and that her case was discussed. Patient was given the contact information for the social workers at the [facility] and resources for support as requested. Patient provided with the Forms department contact information for leave of absence paperwork completion, if applicable. Breast cancer journey map provided to patient and discussed possible Oncotype, if applicable, and other cancer treatments such as chemotherapy and radiation. Provided lumpectomy surgical sheet. Next step in care will be to await for lumpectomy to be scheduled.     Pt was provided with breast nurse navigator contact information and was encouraged to phone with any other questions or concerns.

## 2025-04-04 NOTE — PROGRESS NOTES
Breast Surgery New Patient Consultation    This is the first visit for this 70 year old woman, referred by Dr. Powell, who presents for evaluation of breast cancer.    History of Present Illness:   Ms. Lay Michaels is a 70 year old woman who presents with an imaging detected concern of the left breast.  The patient denies any palpable masses, nipple discharge, skin changes or axillary symptoms.  She does not have any known family history of breast cancer.  She has no personal prior history of breast disease or biopsies.  She had a screening mammogram in 2024 that was unremarkable.  She presented this year for screening mammogram on 2025 and was found to have a focal asymmetry in the upper outer left breast.  She had a left diagnostic evaluation on 2025 and was confirmed to have a 7 mm spiculated suspicious nodule at 2:00, 6 cm from the nipple for which biopsy was recommended.  She had a biopsy in 2025 and was confirmed to have invasive ductal carcinoma, grade 1, ER 95%, DE 0%, HER2/dari negative.  She is here today for evaluation and recommendations for further therapy.        Past Medical History:    Allergic rhinitis    Allergic rhinitis due to other allergen    Asthma (HCC)    Back pain    Constipation    Family history of other cardiovascular diseases    H/O colonoscopy    Hemorrhoids    Leaking of urine    Tubular adenoma of colon    Urinary tract infection, site not specified    Wears glasses       Past Surgical History:   Procedure Laterality Date    Colonoscopy  2015    Tubular adenoma, recheck 5 years    Colonoscopy  2021    Diverticulosis, recheck 5 years          Oral surgery      Other surgical history  2008    C-scope  int hemorrhoids    Other surgical history  2018    LAPAROSCOPIC BILATERAL SALPINGO OOPHORECTOMY WITH LEFT CYSTECTOMY       Gynecological History:  Pt is a   Pt was 23 years old at time of first pregnancy.    She denies any  cumulative breastfeeding history  She achieved menarche at age 13 and LMP age 57  Age of Menopause: 57  Type: natural menopause  She denies any history of hormone replacement therapy  She has history of oral contraceptive use for 10 years, last in 1986.  She denies infertility treatment to achieve pregnancy.    Medications:    No outpatient medications have been marked as taking for the 4/4/25 encounter (Appointment) with Kassie Johnson MD.       Allergies:    Allergies[1]    Family History:   Family History   Problem Relation Age of Onset    Cancer Father         Lung, lymphoma, uterine, kidney    Other (HTN) Father     Other (AAA) Father     Other (Mitral Valve Replacement) Father     Cancer Mother         Kidney    Other (Diverticulitis) Mother     Other (HTN) Mother     Ulcerative Colitis Son     Stroke Paternal Grandmother     Ulcerative Colitis Sister     Crohn's Disease Sister     Bleeding Disorders Sister     Colon Polyps Sister     Crohn's Disease Brother     Alcohol and Other Disorders Associated Brother     Colon Cancer Paternal Aunt     Colon Cancer Paternal Uncle        She is not of Ashkenazi Yars[place] ancestry.    Social History:  History   Alcohol Use    5.0 standard drinks of alcohol/week    2 Glasses of wine, 3 Standard drinks or equivalent per week     Comment: Occasional        History   Smoking Status    Never   Smokeless Tobacco    Never     Ms. Lay Michaels is  with 3 children. She has 5 siblings. She is currently Retired    Review of Systems:  General:   The patient denies, fever, chills, night sweats, fatigue, generalized weakness, change in appetite or weight loss.    HEENT:     The patient denies +eye irritation, +cataracts, redness, glaucoma, yellowing of the eyes, change in vision, color blindness, or +wearing contacts/glasses. The patient denies hearing loss, ringing in the ears, ear drainage, earaches, nasal congestion, nose bleeds, +snoring, pain in mouth/throat, hoarseness,  change in voice, facial trauma.    Respiratory:  The patient denies chronic cough, phlegm, hemoptysis, pleurisy/chest pain, pneumonia, +asthma, wheezing, difficulty in breathing with exertion, emphysema, chronic bronchitis, shortness of breath or abnormal sound when breathing.     Cardiovascular:  There is no history of chest pain, chest pressure/discomfort, palpitations, irregular heartbeat, fainting or near-fainting, difficulty breathing when lying flat, SOB/Coughing at night, swelling of the legs or chest pain while walking.    Breasts:  See history of present illness    Gastrointestinal:     There is no history of difficulty or pain with swallowing, reflux symptoms, vomiting, dark or bloody stools, constipation, yellowing of the skin, indigestion, nausea, change in bowel habits, diarrhea, abdominal pain or vomiting blood.     Genitourinary:  The patient denies +frequent urination, +needing to get up at night to urinate, urinary hesitancy or retaining urine, painful urination, +urinary incontinence, decreased urine stream, blood in the urine or vaginal/penile discharge.    Skin:    The patient denies rash, itching, skin lesions, dry skin, change in skin color or change in moles.     Hematologic/Lymphatic:  The patient denies easily bruising or bleeding or persistent swollen glands or lymph nodes.     Musculoskeletal:  The patient denies muscle aches/pain, joint pain, stiff joints, neck pain, back pain or bone pain.    Neuropsychiatric:  There is no history of migraines or severe headaches, seizure/epilepsy, speech problems, coordination problems, trembling/tremors, fainting/black outs, dizziness, memory problems, loss of sensation/numbness, problems walking, weakness, tingling or burning in hands/feet. There is no history of abusive relationship, bipolar disorder, sleep disturbance, anxiety, depression or feeling of despair.    Endocrine:    There is no history of poor/slow wound healing, weight loss/gain,  fertility or hormone problems, cold intolerance, thyroid disease.     Allergic/Immunologic:  There is no history of hives, hay fever, angioedema or anaphylaxis.    BP (!) 164/84 (BP Location: Right arm, Patient Position: Sitting, Cuff Size: adult)   Pulse 60   Temp 98.1 °F (36.7 °C) (Temporal)   Resp 16   Ht 1.6 m (5' 3\")   Wt 66.7 kg (147 lb)   LMP 12/23/2013   SpO2 100%   BMI 26.04 kg/m²     Physical Exam:  The patient is an alert, oriented, well-nourished and  well-developed woman who appears her stated age. Her speech patterns and movements are normal. Her affect is appropriate.    HEENT: The head is normocephalic. The neck is supple. The thyroid is not enlarged and is without palpable masses/nodules. There are no palpable masses. The trachea is in the midline. Conjunctiva are clear, non-icteric.    Chest: The chest expands symmetrically. The lungs are clear to auscultation.    Heart: The rhythm is regular.  There are no murmurs, rubs, gallops or thrills.    Breasts:  Her breasts are symmetrical with a cup size 36C.  Right breast: The skin, nipple ,and areola appear normal. There is no skin dimpling with movement of the pectoralis. There is no nipple retraction. No nipple discharge can be elicited. The parenchyma is mildly nodular. There are no dominant masses in the breast. The axillary tail is normal.  Left breast:   The skin, nipple, and areola appear normal. There is no skin dimpling with movement of the pectoralis. There is no nipple retraction. No nipple discharge can be elicited. The parenchyma is mildly nodular. There are no dominant masses in the breast. The axillary tail is normal.    Abdomen:  The abdomen is soft, flat and non tender. The liver is not enlarged. There are no palpable masses.    Lymph Nodes:  The supraclavicular, axillary and cervical regions are free of significant lymphadenopathy.    Back: There is no vertebral column tenderness.    Skin: The skin appears normal. There are no  suspicious appearing rashes or lesions.    Extremities: The extremities are without deformity, cyanosis or edema.    Impression:   Ms. Lay Michaels is a 70 year old woman presents with imaging detected left breast cancer, clinical stage T1 NX MX.    Discussion and Plan:  I had a discussion with the Patient regarding her breast exam. On exam today I found her to be healing well since recent biopsy with no other clinical findings.  I personally reviewed the recent imaging and pathology and we discussed this at length.    The natural history and evolution of breast cancer were discussed with Ms. Lay Michaels and her  family, including the difference between in-situ and invasive carcinoma, and the distinction  between local and systemic disease and local and systemic therapy. For local treatment options,  I explained the risks and benefits of breast conservation and mastectomy (with or without  reconstruction), including the fact that survival rates are equal with these two approaches.  If breast conservation is elected, I explained the need for free margins, the possibility of re-  excision to achieve free margins, and the need for post-operative radiotherapy. The approach  to fredy staging was also described, including the technique, risks and benefits of sentinel node  biopsy, the possible need for axillary dissection, and the long-term sequelae of this procedure.  With regard to systemic therapy, final recommendation will be made following receipt of final  pathology post-op, but I outlined the possibility of endocrine therapy, chemotherapy, and  herceptin, depending on tumor marker profile.  Following this discussion, where all of the patient's questions were answered, we agreed to  proceed with left breast wire localized impact with left sentinel lymph node biopsy. The risks and possible complications of the procedure were explained to the patient and her family and she understood and agreed to the proposed plan. She  was given ample opportunity for questions and those questions were answered to her satisfaction. She has been  encouraged to contact the office with any questions or concerns prior to her next appointment.     This note was created by [employer] voice recognition. Errors in content may be related to improper recognition by the system; efforts to review and correct have been done but errors may still exist. Please be advised the primary purpose of this note is for me to communicate medical care. Standard sentence structure is not always used. Medical terminology and medical abbreviations may be used. There may be grammatical, typographical, and automated fill ins that may have errors missed in proofreading.           [1]   Allergies  Allergen Reactions    Penicillin V Potassium [Penicillin V] RASH    Penicillins RASH

## 2025-04-04 NOTE — PATIENT INSTRUCTIONS
Dr. Kassie Johnson  Tel: [phone]  Fax: [phone] [address] [address]  [phone]       Surgery/Procedure: Left breast wire localized lumpectomy, left lymphoscintigraphy, left sentinel lymph node biopsy       Anesthesia:   Gen  Surgery Length:   1.5 hours CPT:  75560,99430,29200   Wire LOC:   Yes Nuc Med:   Yes Melisa Seed:  No       Dx & ICD-10: Invasive carcinoma of breast (HCC) (C50.919)     Radiology Instructions: Left breast, 2 o'clock position , Q shaped clip , biopsy confirms invasive ductal carcinoma   _______________________________________________________________________________    Someone must accompany you the day of the procedure to drive you home safely, because of anesthesia.  You will need an adult  to stay with you the first night following your surgery.  You must remove any kind of makeup, acrylic nails, lotions, powders, creams or deodorant.  [facility] ONLY: Pre-admission will give instruct you on when to take Gatorade and Tylenol/acetaminophen prior to your surgery, purchase 2 - 12oz bottles of regular Gatorade (NOT RED/SUGAR FREE). Otherwise, you may not eat or drink anything else after 11PM the night before surgery.    [facility] ONLY: You may not eat or drink anything after midnight the day of your surgery.   Wear comfortable clothing that can be easily removed.  If you wear dentures, contacts lenses, or any prosthesis, you will be asked to remove them.  Do not drink alcohol or smoke 24 hours prior to your procedure.  Bring a picture ID and your insurance card.  Covid-19 testing is no longer required before surgery unless you are experiencing symptoms such as fever, cough, congestion, etc.   The Pre-Admission Testing Department will call the day before to confirm your procedure, give you the time you need to arrive by and directions on where to go. They begin making calls after 2pm, if you are not contacted by 4pm, please call the  surgeon's office listed above.  Do not take any blood thinners at least one week prior to the procedure/surgery. This includes aspirin, baby aspirin, Ibuprofen products, herbal supplements, diet medications, vitamin E, fish oil and green tea supplements. Please check other supplements for these ingredients. *TYLENOL or acetaminophen is acceptable*  If you take Coumadin, Plavix, Xarelto, or Eliquis, please contact your prescribing physician for special instructions on how long to hold. If you take insulin contact your primary care physician for special instructions.  Our surgery scheduler, Aliyah, will be contacting you to discuss surgery dates. If you have any questions related to scheduling your surgery, please reach out to her at [phone].  _____________________________________________________________________  PRE-OPERATIVE TESTING IF INDICATED BELOW  PLEASE COMPLETE ASAP (AT LEAST 14-21 DAYS PRIOR TO SURGERY)  [] CBC [] BMP [] CMP [] EKG    [] PT, PTT, INR [] Cardiac Clearance  [] H&P Medical Clearance [] Chest X-ray     Please call Central Scheduling to schedule an appointment for pre-operative labs/tests once your orders are placed @ [phone]  Does the patient have a pacemaker or ICD?                              Faxitron/Trident in OR?  [] Yes   [x] No                               [] Yes   [x] No

## 2025-04-07 NOTE — IMAGING NOTE
Spoke with Lay Michaels regarding [place] Lymph Node mapping to be done in nuclear medicine department before lumpectomy surgery scheduled for 5-01-25 with Dr Johnson. Also reviewed wire localization to be done in [facility]. Both procedures explained and all questions answered.  Pt to be transported to each department by  through [facility]. Will make every effort to ensure privacy and safety when transported between departments. Breast Care Coordinator to provide education and written information regarding breast surgery, breast cancer and lymphedema. Pt verbalized understanding and had no further questions at this time.

## 2025-04-17 PROBLEM — C50.919 INVASIVE CARCINOMA OF BREAST (HCC): Status: ACTIVE | Noted: 2025-04-17

## 2025-04-21 ENCOUNTER — TELEPHONE (OUTPATIENT)
Age: 70
End: 2025-04-21

## 2025-04-22 ENCOUNTER — TELEPHONE (OUTPATIENT)
Age: 70
End: 2025-04-22

## 2025-04-28 ENCOUNTER — ANESTHESIA EVENT (OUTPATIENT)
Dept: SURGERY | Facility: HOSPITAL | Age: 70
End: 2025-04-28
Payer: MEDICARE

## 2025-04-28 ENCOUNTER — LABORATORY ENCOUNTER (OUTPATIENT)
Dept: LAB | Age: 70
End: 2025-04-28
Attending: SURGERY
Payer: MEDICARE

## 2025-04-28 DIAGNOSIS — Z01.818 PRE-OP TESTING: ICD-10-CM

## 2025-04-28 LAB
ANION GAP SERPL CALC-SCNC: 5 MMOL/L (ref 0–18)
BUN BLD-MCNC: 13 MG/DL (ref 9–23)
CALCIUM BLD-MCNC: 10.5 MG/DL (ref 8.7–10.6)
CHLORIDE SERPL-SCNC: 109 MMOL/L (ref 98–112)
CO2 SERPL-SCNC: 26 MMOL/L (ref 21–32)
CREAT BLD-MCNC: 1.08 MG/DL (ref 0.55–1.02)
EGFRCR SERPLBLD CKD-EPI 2021: 56 ML/MIN/1.73M2 (ref 60–?)
FASTING STATUS PATIENT QL REPORTED: YES
GLUCOSE BLD-MCNC: 90 MG/DL (ref 70–99)
OSMOLALITY SERPL CALC.SUM OF ELEC: 290 MOSM/KG (ref 275–295)
POTASSIUM SERPL-SCNC: 4.8 MMOL/L (ref 3.5–5.1)
SODIUM SERPL-SCNC: 140 MMOL/L (ref 136–145)

## 2025-04-28 PROCEDURE — 80048 BASIC METABOLIC PNL TOTAL CA: CPT

## 2025-04-28 PROCEDURE — 36415 COLL VENOUS BLD VENIPUNCTURE: CPT

## 2025-05-01 ENCOUNTER — HOSPITAL ENCOUNTER (OUTPATIENT)
Facility: HOSPITAL | Age: 70
Setting detail: HOSPITAL OUTPATIENT SURGERY
Discharge: HOME | End: 2025-05-01
Attending: SURGERY | Admitting: SURGERY
Payer: MEDICARE

## 2025-05-01 ENCOUNTER — HOSPITAL ENCOUNTER (OUTPATIENT)
Dept: MAMMOGRAPHY | Facility: HOSPITAL | Age: 70
Discharge: HOME | End: 2025-05-01
Attending: SURGERY | Admitting: SURGERY
Payer: MEDICARE

## 2025-05-01 ENCOUNTER — HOSPITAL ENCOUNTER (OUTPATIENT)
Dept: NUCLEAR MEDICINE | Facility: HOSPITAL | Age: 70
Discharge: HOME | End: 2025-05-01
Attending: SURGERY | Admitting: SURGERY
Payer: MEDICARE

## 2025-05-01 ENCOUNTER — ANESTHESIA (OUTPATIENT)
Dept: SURGERY | Facility: HOSPITAL | Age: 70
End: 2025-05-01
Payer: MEDICARE

## 2025-05-01 VITALS
HEART RATE: 77 BPM | RESPIRATION RATE: 18 BRPM | SYSTOLIC BLOOD PRESSURE: 122 MMHG | TEMPERATURE: 97 F | HEIGHT: 63 IN | DIASTOLIC BLOOD PRESSURE: 84 MMHG | BODY MASS INDEX: 25.75 KG/M2 | OXYGEN SATURATION: 93 % | WEIGHT: 145.31 LBS

## 2025-05-01 DIAGNOSIS — C50.919 INVASIVE CARCINOMA OF BREAST (HCC): ICD-10-CM

## 2025-05-01 DIAGNOSIS — Z01.818 PRE-OP TESTING: Primary | ICD-10-CM

## 2025-05-01 PROCEDURE — 88307 TISSUE EXAM BY PATHOLOGIST: CPT | Performed by: SURGERY

## 2025-05-01 PROCEDURE — 88360 TUMOR IMMUNOHISTOCHEM/MANUAL: CPT | Performed by: SURGERY

## 2025-05-01 PROCEDURE — 88342 IMHCHEM/IMCYTCHM 1ST ANTB: CPT | Performed by: SURGERY

## 2025-05-01 PROCEDURE — 19281 PERQ DEVICE BREAST 1ST IMAG: CPT | Performed by: SURGERY

## 2025-05-01 PROCEDURE — 78195 LYMPH SYSTEM IMAGING: CPT | Performed by: SURGERY

## 2025-05-01 PROCEDURE — 88305 TISSUE EXAM BY PATHOLOGIST: CPT | Performed by: SURGERY

## 2025-05-01 PROCEDURE — 76098 X-RAY EXAM SURGICAL SPECIMEN: CPT | Performed by: SURGERY

## 2025-05-01 RX ORDER — SODIUM CHLORIDE, SODIUM LACTATE, POTASSIUM CHLORIDE, CALCIUM CHLORIDE 600; 310; 30; 20 MG/100ML; MG/100ML; MG/100ML; MG/100ML
INJECTION, SOLUTION INTRAVENOUS CONTINUOUS
Status: DISCONTINUED | OUTPATIENT
Start: 2025-05-01 | End: 2025-05-01

## 2025-05-01 RX ORDER — ACETAMINOPHEN 10 MG/ML
INJECTION, SOLUTION INTRAVENOUS AS NEEDED
Status: DISCONTINUED | OUTPATIENT
Start: 2025-05-01 | End: 2025-05-01 | Stop reason: SURG

## 2025-05-01 RX ORDER — HYDROMORPHONE HYDROCHLORIDE 1 MG/ML
0.2 INJECTION, SOLUTION INTRAMUSCULAR; INTRAVENOUS; SUBCUTANEOUS EVERY 5 MIN PRN
Status: DISCONTINUED | OUTPATIENT
Start: 2025-05-01 | End: 2025-05-01

## 2025-05-01 RX ORDER — DIPHENHYDRAMINE HYDROCHLORIDE 50 MG/ML
12.5 INJECTION, SOLUTION INTRAMUSCULAR; INTRAVENOUS AS NEEDED
Status: DISCONTINUED | OUTPATIENT
Start: 2025-05-01 | End: 2025-05-01

## 2025-05-01 RX ORDER — DIAZEPAM 5 MG/1
5 TABLET ORAL EVERY 30 MIN PRN
Status: DISCONTINUED | OUTPATIENT
Start: 2025-05-01 | End: 2025-05-01 | Stop reason: HOSPADM

## 2025-05-01 RX ORDER — HYDROCODONE BITARTRATE AND ACETAMINOPHEN 5; 325 MG/1; MG/1
1-2 TABLET ORAL EVERY 6 HOURS PRN
Qty: 20 TABLET | Refills: 0 | Status: SHIPPED | OUTPATIENT
Start: 2025-05-01

## 2025-05-01 RX ORDER — HYDROMORPHONE HYDROCHLORIDE 1 MG/ML
0.6 INJECTION, SOLUTION INTRAMUSCULAR; INTRAVENOUS; SUBCUTANEOUS EVERY 5 MIN PRN
Status: DISCONTINUED | OUTPATIENT
Start: 2025-05-01 | End: 2025-05-01

## 2025-05-01 RX ORDER — LIDOCAINE HYDROCHLORIDE 10 MG/ML
INJECTION, SOLUTION EPIDURAL; INFILTRATION; INTRACAUDAL; PERINEURAL AS NEEDED
Status: DISCONTINUED | OUTPATIENT
Start: 2025-05-01 | End: 2025-05-01 | Stop reason: SURG

## 2025-05-01 RX ORDER — OXYCODONE HYDROCHLORIDE 5 MG/1
10 TABLET ORAL EVERY 4 HOURS PRN
Status: DISCONTINUED | OUTPATIENT
Start: 2025-05-01 | End: 2025-05-01

## 2025-05-01 RX ORDER — LIDOCAINE HYDROCHLORIDE AND EPINEPHRINE 10; 10 MG/ML; UG/ML
INJECTION, SOLUTION INFILTRATION; PERINEURAL AS NEEDED
Status: DISCONTINUED | OUTPATIENT
Start: 2025-05-01 | End: 2025-05-01 | Stop reason: HOSPADM

## 2025-05-01 RX ORDER — BUPIVACAINE HYDROCHLORIDE 5 MG/ML
INJECTION, SOLUTION EPIDURAL; INTRACAUDAL; PERINEURAL AS NEEDED
Status: DISCONTINUED | OUTPATIENT
Start: 2025-05-01 | End: 2025-05-01 | Stop reason: HOSPADM

## 2025-05-01 RX ORDER — HYDROMORPHONE HYDROCHLORIDE 1 MG/ML
0.4 INJECTION, SOLUTION INTRAMUSCULAR; INTRAVENOUS; SUBCUTANEOUS EVERY 5 MIN PRN
Status: DISCONTINUED | OUTPATIENT
Start: 2025-05-01 | End: 2025-05-01

## 2025-05-01 RX ORDER — ACETAMINOPHEN 500 MG
1000 TABLET ORAL ONCE
Status: DISCONTINUED | OUTPATIENT
Start: 2025-05-01 | End: 2025-05-01 | Stop reason: HOSPADM

## 2025-05-01 RX ORDER — OXYCODONE HYDROCHLORIDE 5 MG/1
5 TABLET ORAL EVERY 4 HOURS PRN
Status: DISCONTINUED | OUTPATIENT
Start: 2025-05-01 | End: 2025-05-01

## 2025-05-01 RX ORDER — SODIUM CHLORIDE 9 MG/ML
INJECTION, SOLUTION INTRAMUSCULAR; INTRAVENOUS; SUBCUTANEOUS AS NEEDED
Status: DISCONTINUED | OUTPATIENT
Start: 2025-05-01 | End: 2025-05-01 | Stop reason: HOSPADM

## 2025-05-01 RX ORDER — LIDOCAINE AND PRILOCAINE 25; 25 MG/G; MG/G
CREAM TOPICAL ONCE
Status: COMPLETED | OUTPATIENT
Start: 2025-05-01 | End: 2025-05-01

## 2025-05-01 RX ORDER — METOCLOPRAMIDE HYDROCHLORIDE 5 MG/ML
5 INJECTION INTRAMUSCULAR; INTRAVENOUS EVERY 8 HOURS PRN
Status: DISCONTINUED | OUTPATIENT
Start: 2025-05-01 | End: 2025-05-01

## 2025-05-01 RX ORDER — MIDAZOLAM HYDROCHLORIDE 1 MG/ML
INJECTION INTRAMUSCULAR; INTRAVENOUS AS NEEDED
Status: DISCONTINUED | OUTPATIENT
Start: 2025-05-01 | End: 2025-05-01 | Stop reason: SURG

## 2025-05-01 RX ORDER — OXYCODONE HYDROCHLORIDE 5 MG/1
15 TABLET ORAL EVERY 4 HOURS PRN
Status: DISCONTINUED | OUTPATIENT
Start: 2025-05-01 | End: 2025-05-01

## 2025-05-01 RX ORDER — NALOXONE HYDROCHLORIDE 0.4 MG/ML
0.08 INJECTION, SOLUTION INTRAMUSCULAR; INTRAVENOUS; SUBCUTANEOUS AS NEEDED
Status: DISCONTINUED | OUTPATIENT
Start: 2025-05-01 | End: 2025-05-01

## 2025-05-01 RX ADMIN — ACETAMINOPHEN 1000 MG: 10 INJECTION, SOLUTION INTRAVENOUS at 16:38:00

## 2025-05-01 RX ADMIN — SODIUM CHLORIDE, SODIUM LACTATE, POTASSIUM CHLORIDE, CALCIUM CHLORIDE: 600; 310; 30; 20 INJECTION, SOLUTION INTRAVENOUS at 16:24:00

## 2025-05-01 RX ADMIN — LIDOCAINE HYDROCHLORIDE 50 MG: 10 INJECTION, SOLUTION EPIDURAL; INFILTRATION; INTRACAUDAL; PERINEURAL at 15:45:00

## 2025-05-01 RX ADMIN — MIDAZOLAM HYDROCHLORIDE 2 MG: 1 INJECTION INTRAMUSCULAR; INTRAVENOUS at 15:43:00

## 2025-05-01 NOTE — IMAGING NOTE
Assisted  with mammography guided needle localization of the left breast.   Lay Michaels identified with spelling of name and date of birth.   Medications and allergies reviewed. The following allergies were reported  Penicillin V Potassium [Penicillin V]RASH  PenicillinsRASH    History: left breast-Invasive carcinoma of breast   Surgery: Left breast wire localized lumpectomy, left lymphoscintigraphy, left sentinel lymph node biopsy     Order verified.  Procedure explained and questions answered. Lay Michaels verbalized understanding and agreement.  Educational material provided  1327: Written consent obtained.     1329: [place] [facility]    1331: Dr. Lopez present    1331: Time out complete.    1330: Site prepped in a sterile manner.   1332: Lidocaine administered for anesthetic affect.  1333: Bains 20G x 5cm needle placed- Left breast, 2 o'clock position , Q shaped clip , biopsy confirms invasive ductal carcinoma     Emotional support provided.  Lay Michaels tolerated procedure well.     Site cleaned.  Wire secured with blue clip, steri strips, sterile 4x4 gauze dressing, and Tegaderm.    Lay Michaels transported via wheelchair to pre-op/surgery holding in stable condition. Ms. Michaels without complaints or concerns at this time.

## 2025-05-01 NOTE — ANESTHESIA PROCEDURE NOTES
Airway  Date/Time: 5/1/2025 3:47 PM  Reason: elective      General Information and Staff   Patient location during procedure: OR  Anesthesiologist: Rohit Dupree MD  Performed: anesthesiologist   Performed by: Rohit Dupree MD  Authorized by: Rohit Dupree MD        Indications and Patient Condition  Indications for airway management: anesthesia  Sedation level: moderate (conscious sedation)      Preoxygenated: yesPatient position: sniffing    Mask difficulty assessment: 1 - vent by mask    Final Airway Details    Final airway type: supraglottic airway      Successful airway: classic  Size: 3     Number of attempts at approach: 1  Number of other approaches attempted: 0

## 2025-05-01 NOTE — OPERATIVE REPORT
[facility]    PATIENT'S NAME: QI AUSTIN   ATTENDING PHYSICIAN: Kassie Johnson M.D.   OPERATING PHYSICIAN: Kassie Johnson M.D.   PATIENT ACCOUNT#:   [id number]    LOCATION:  [facility] 2 EDW 10  MEDICAL RECORD #:   [id number]       YOB: 1955  ADMISSION DATE:       05/01/2025      OPERATION DATE:  05/01/2025    OPERATIVE REPORT      PREOPERATIVE DIAGNOSIS:  Left breast cancer.  POSTOPERATIVE DIAGNOSIS:  Left breast cancer.  PROCEDURE:  Left breast wire-localized lumpectomy with left breast specimen radiography, left breast advancement flap mastopexy for a defect measuring 12 sq cm, left breast injection of blue dye for sentinel lymph node identification, and left sentinel lymph node biopsy.    ASSISTANT:  Lucero Grimm CSA     ANESTHESIA:  General anesthesia and local.    ESTIMATED BLOOD LOSS:  5 mL.    DRAINS:  None.    COMPLICATIONS:  None.    DISPOSITION:  Stable on transfer to recovery room.    INDICATIONS:  The patient is a 70-year-old female who presents with an imaging-detected concern in the left breast.  She was found on imaging to have a well-circumcised lesion and underwent biopsy that confirmed invasive carcinoma.  We discussed local treatment options.  She was motivated for a breast-conserving approach.  We discussed the need to achieve free margins, the possibility of re-excision to achieve free margins, as well as the possible need for postoperative further systemic and/or radiation therapy.  The risks and possible complications including, but not limited to, infection, bleeding, injury to surrounding structures, and possible need for reoperation were discussed with the patient.  She agreed to proceed.    OPERATIVE TECHNIQUE:  Patient was brought to the imaging suite, where she underwent a wire localization in the area of the cancer in the left breast.  She was also brought to the nuclear medicine department where she underwent injection of radioisotope as  well as lymphoscintigraphy for sentinel lymph node identification preoperatively of the left breast.  She was brought to the OR, placed in supine position, properly padded and secured, given a dose of IV antibiotics, and sequential compression devices were applied to the legs for DVT prophylaxis.  A diluted methylene blue dye was injected in a periareolar location and massaged approximately 5 minutes, and the left breast and arm were prepped and draped in usual sterile fashion.  Lidocaine 1% with epinephrine was used to infiltrate the targeted incision site, and a transverse incision was made at the inferior aspect of the left axillary hairline after confirming uptake with a handheld gamma counter.  Using sharp dissection and electrocautery, I dissected through the clavipectoral fascia to the deep axilla where I identified 1 sentinel lymph node, which was excised and sent for routine permanent pathologic evaluation.  The wound was irrigated.  Hemostasis was assured with electrocautery and hemoclips.  Through this incision, I was able to see the trajectory of the wire that had been placed in the upper outer quadrant.  I tunneled through this incision, identified the wire, and brought this into the field.  Using sharp dissection and electrocautery, I then excised a segment of breast tissue surrounding the tip of the wire, and I carefully oriented it with a short stitch and single clip superiorly, long stitch and double clip laterally, in order to allow for appropriate pathological margin assessment and review.  This was then placed in the imaging device where specimen x-ray confirmed the presence of targeted clip and area with adequate margins as deemed by myself.  Using sharp dissection and electrocautery, 6 margins were then taken from the interior of the lumpectomy cavity.  Each was marked with a clip at true margin, individually labeled, and sent for routine permanent pathologic evaluation.  The wound was then  irrigated with warm sterile saline, hemostasis was assured with electrocautery, and hemoclips were placed around the periphery of the cavity to assist with subsequent surveillance.  She was found to have a large defect in the upper central breast measuring at least 12 sq cm thought to impair both optimal wound healing and cosmesis, for which we proceeded with an advancement flap mastopexy.  This required I place a counterincision through the immediately adjacent lateral superior breast parenchyma down to the level of the pectoralis fascia.  I then used a superior vascular pedicle, mobilized this into the aforementioned defect, and secured it at the level of pectoralis fascia a running 3-0 PDS suture.  The wound was then closed with an interrupted 3-0 Vicryl for deep layer and a running 4-0 subcuticular Monocryl for skin.  Mastisol and Steri-Strips were applied, and 0.5% Marcaine was instilled in the cavity to assist with postoperative analgesia.  A sterile dressing and compression bra were placed.  Blood loss was minimal.  All counts were correct at the conclusion of the procedure.  She tolerated the procedure well and was transferred to the recovery area in stable condition.    Dictated By Kassie Johnson M.D.  d: 05/01/2025 16:31:04  t: 05/01/2025 18:20:32  Job [id number]  [facility]/    cc: Vu Powell M.D.    [place] Node Biopsy for Breast Cancer - Left  Operation performed with curative intent. Yes   Tracer(s) used to identify sentinel nodes in the upfront surgery (non-neoadjuvant) setting (select all that apply). Dye and Radioactive tracer   Tracer(s) used to identify sentinel nodes in the neoadjuvant setting (select all that apply). N/A   All nodes (colored or non-colored) present at the end of a dye-filled lymphatic channel were removed. Yes   All significantly radioactive nodes were removed. Yes   All palpably suspicious nodes were removed. Yes   Biopsy-proven positive nodes marked with clips  prior to chemotherapy were identified and removed. N/A        16

## 2025-05-01 NOTE — ANESTHESIA PREPROCEDURE EVALUATION
PRE-OP EVALUATION    Patient Name: Lay Michaels    Admit Diagnosis: Invasive carcinoma of breast (HCC) [C50.919]    Pre-op Diagnosis: Invasive carcinoma of breast (HCC) [C50.919]    Left breast wire localized lumpectomy, left lymphoscintigraphy, left sentinel lymph node biopsy    Anesthesia Procedure: Left breast wire localized lumpectomy, left lymphoscintigraphy, left sentinel lymph node biopsy (Left)    Surgeons and Role:     * Kassie Johnson MD - Primary    Pre-op vitals reviewed.  Temp: 97.7 °F (36.5 °C)  Pulse: 65  Resp: 16  BP: 147/71  SpO2: 100 %  Body mass index is 25.74 kg/m².    Current medications reviewed.  Hospital Medications:  Current Medications[1]    Outpatient Medications:   Prescriptions Prior to Admission[2]    Allergies: Penicillin v potassium [penicillin v] and Penicillins      Anesthesia Evaluation    Patient summary reviewed.    Anesthetic Complications  (-) history of anesthetic complications         GI/Hepatic/Renal    Negative GI/hepatic/renal ROS.                             Cardiovascular      ECG reviewed.                                                 Endo/Other    Negative endo/other ROS.                              Pulmonary      (+) asthma                     Neuro/Psych    Negative neuro/psych ROS.                          LBBB, EF 50-55%    Past Surgical History[3]  Social Hx on file[4]  History   Drug Use No     Available pre-op labs reviewed.  Lab Results   Component Value Date    WBC 4.6 02/25/2025    RBC 4.39 02/25/2025    HGB 13.1 02/25/2025    HCT 39.9 02/25/2025    MCV 90.9 02/25/2025    MCH 29.8 02/25/2025    MCHC 32.8 02/25/2025    RDW 13.4 02/25/2025    .0 02/25/2025     Lab Results   Component Value Date     04/28/2025    K 4.8 04/28/2025     04/28/2025    CO2 26.0 04/28/2025    BUN 13 04/28/2025    CREATSERUM 1.08 (H) 04/28/2025    GLU 90 04/28/2025    CA 10.5 04/28/2025            Airway      Mallampati: III  Mouth opening: <3 FB  TM distance: 4  - 6 cm  Neck ROM: full Cardiovascular    Cardiovascular exam normal.         Dental    Dentition appears grossly intact         Pulmonary    Pulmonary exam normal.                 Other findings        ASA: 2   Plan: general  NPO status verified and patient meets guidelines.  Patient has not taken beta blockers in last 24 hours.      Comment: GA with LMA/ETT. Risks discussed including sore throat, hoarse voice, dental trauma, nausea/vomiting  Plan/risks discussed with: patient and spouse            Present on Admission:  **None**               [1]  • acetaminophen (Tylenol Extra Strength) tab 1,000 mg  1,000 mg Oral Once   • lactated ringers infusion   Intravenous Continuous   • ceFAZolin (Ancef) 2g in 10mL IV syringe premix  2 g Intravenous Once   • diazePAM (Valium) tab 5 mg  5 mg Oral Q30 Min PRN   • [COMPLETED] lidocaine-prilocaine (Emla) 2.5-2.5 % cream   Topical Once   [2]  Medications Prior to Admission   Medication Sig Dispense Refill Last Dose/Taking   • ketorolac 0.5 % Ophthalmic Solution Place 1 drop into both eyes in the morning and 1 drop at noon and 1 drop in the evening and 1 drop before bedtime. As needed.   Past Week   • Lutein 15-0.7 MG Oral Cap Take 1 capsule by mouth in the morning.   2025   • Cholecalciferol (VITAMIN D) 50 MCG (2000 UT) Oral Tab Take by mouth.   2025   • Vitamin B12 100 MCG Oral Tab Take 1 tablet (100 mcg total) by mouth in the morning.   2025   • Calcium 500-125 MG-UNIT Oral Tab Take by mouth.   2025   • Docusate Sodium (STOOL SOFTENER OR) Take by mouth in the morning.   2025   • Multiple Vitamin (DAILY VITAMINS) Oral Tab Take 1 tablet by mouth in the morning.   2025   [3]  Past Surgical History:  Procedure Laterality Date   • Colonoscopy  2015    Tubular adenoma, recheck 5 years   • Colonoscopy  2021    Diverticulosis, recheck 5 years   •      • Oral surgery     • Other surgical history  2008    C-scope  int hemorrhoids   •  Other surgical history  04/03/2018    LAPAROSCOPIC BILATERAL SALPINGO OOPHORECTOMY WITH LEFT CYSTECTOMY   [4]  Social History  Socioeconomic History   • Marital status:    Tobacco Use   • Smoking status: Never     Passive exposure: Never   • Smokeless tobacco: Never   • Tobacco comments:     No tobacco use   Vaping Use   • Vaping status: Never Used   Substance and Sexual Activity   • Alcohol use: Yes     Alcohol/week: 5.0 standard drinks of alcohol     Types: 2 Glasses of wine, 3 Standard drinks or equivalent per week     Comment: Occasional    • Drug use: No   • Sexual activity: Yes     Partners: Male   Other Topics Concern   • Caffeine Concern No   • Exercise Yes   • Seat Belt Yes   • Special Diet No   • Stress Concern No   • Weight Concern Yes

## 2025-05-01 NOTE — DISCHARGE INSTRUCTIONS
Breast Surgery  Post-operative Instructions  Excisional Biopsy, Lumpectomy, Mastectomy, [place] Node Biopsy, or Axillary  Lymph Node Dissection  Kassie Johnson MD  General Instructions  The following instructions will provide helpful information that will assist your recovery. These are designed to be general guidelines. Please remember that everyone heals and recovers differently. Listen to your body and rest when you are tired. If you have any questions or concerns, please do not hesitate to contact my office. I would like to see you in the office about one week after surgery, please schedule and appointment through my office to make a post-operative appointment if you do not already have one.     Restrictions  There are no lifting weight restrictions for the arm on the surgical side. You may gradually increase the amount of weight based on your comfort level. You should avoid a lot of repetitious activity with the arm until the drain is out (if one was placed) and the wound is well-healed (about two weeks).   You should not drive a car until you believe you can react to an emergency situation and you’re no longer taking narcotic pain medications.   You may shower the day after surgery. You should not bathe or swim (i.e. submerge wound) until the wound is well healed (about two weeks).  There are no dietary restrictions.    Exercise  You may begin arm exercises within a couple days. Do these 2 or 3 times per day, beginning with light exercise and gradually increase your range of motion and repetitions. This will help your arm regain full mobility. We will address your activity level again at your post-operative visit.   You will have pain medication prescribed before discharge. Take this as directed to relieve pain. It is important that you be comfortable so that you may continue your stretching exercises.   If you find the medication prescribed is too strong, try Tylenol (Acetaminophen) or  Ibuprofen.    Wound Care  You may remove the gauze dressing on the first or second postoperative day and then shower. You should leave the steri-strips in place; they will start to peel off about 10 days after your surgery. The stitches are all underneath the skin and will dissolve on their own. You will not need any stitches removed except if you have a drain in place.  I encourage you to shower once the outer bandage is removed, you may use soap and water directly over the steri-strips and pat dry following.  You should keep gauze dressing on the wound until the wound is completely dry and without drainage-usually 1-3 days.   If a surgical bra was placed after the surgery, I encourage you to wear it as much as possible during the week following the procedure (including during sleep). Alternatively, you may choose to wear your own bra provided it is comfortable, provides support and does not have an underwire. If the breast doesn’t move it is less painful.  If an elastic bandage was placed around your chest after the surgery you may remove it on the 1st or 2nd day after surgery. If you prefer to leave it on longer, you may.  It is normal to feel a lump in the area of the incisions for up to 6 months. This is part of the healing process. Eventually the breast will return to its normal condition.   Drain Care (if placed at time of axillary dissection or mastectomy)-This will be explained by your nurse prior to discharge.  Empty the drain and strip the tubing 2-3 times daily, more often if the plastic squeeze bottle fills up. This will prevent the tubing from clogging.   Starting at the top of the tubing next to your body firmly grasp the tubing with the index finger and thumb of one hand. With the other hand use the index finger and thumb to move the fluid down the tubing (this is called “stripping the tubing”).   Uncap the pouring spout and squeeze the contents of the plastic bottle into the measuring cup.   Squeeze  the bottle flat to create suction and replace the cap while squeezing to maintain the vacuum.   Measure and record the output and discard the fluid into the toilet. Record the output each time you empty the bottle.   Keep track of the output (drainage) and when the total is down to 30 cc’s or less over a 24-hour period we’ll remove the drain in the office. This is usually after 1-2  weeks, but can be longer in certain patients.   Drain removal takes about 30 seconds and is virtually painless. The suture is cut and the drain slides right out. You should call my office as the output approaches 30 cc’s over 24 hours so that we may schedule an office visit for drain removal.  If you have had reconstruction your plastic surgeon will remove the drain according to their protocol.    Pain Medication  You will be given a prescription for a narcotic pain medication (usually Norco) upon discharge. Many patients have very little pain and don’t want to use the narcotic. Don’t be afraid to use it if you’re uncomfortable. If you’d prefer you may substitute Tylenol or Ibuprofen (Motrin, Advil). Using an ice pack for a few minutes over the incision can also alleviate pain. If you do use the narcotic medication, use an over the counter stool softener or gentle laxative and stay well-hydrated as constipation is not uncommon with narcotics.    Pathology Report  The Pathology report is usually available 4-5 business days following the surgery. I will call you  with the results once the report is available.    Notify my office if:   Your temperature is over 101.5 F   You notice increasing swelling, redness, warmth or drainage from around the incision or drain site.    If you experience any problems please call my office and either my nurse or myself will respond. After hours, you will be forwarded to my answering service which will help you get in touch with myself or the physician covering for me.

## 2025-05-01 NOTE — ANESTHESIA POSTPROCEDURE EVALUATION
[facility]    Lay Michaels Patient Status:  Hospital Outpatient Surgery   Age/Gender 70 year old female MRN [id number]   Location [facility] SURGERY Attending Kassie Johnson MD   Hosp Day # 0 PCP Vu Powell MD       Anesthesia Post-op Note    Left breast wire localized lumpectomy, left lymphoscintigraphy, left sentinel lymph node biopsy    Procedure Summary       Date: 05/01/25 Room / Location:  MAIN OR [address] [place] [address] MAIN OR    Anesthesia Start: 1542 Anesthesia Stop:     Procedure: Left breast wire localized lumpectomy, left lymphoscintigraphy, left sentinel lymph node biopsy (Left: Breast) Diagnosis:       Invasive carcinoma of breast (HCC)      (Invasive carcinoma of breast (HCC) [C50.919])    Surgeons: Kassie Johnson MD Anesthesiologist: Rohit Dupree MD    Anesthesia Type: general ASA Status: 2            Anesthesia Type: general    Vitals Value Taken Time   /62 05/01/25 16:37   Temp 98.3 05/01/25 16:37   Pulse 91 05/01/25 16:37   Resp 16 05/01/25 16:37   SpO2 98 05/01/25 16:37           Patient Location: PACU    Anesthesia Type: general    Airway Patency: patent and extubated    Postop Pain Control: adequate    Mental Status: mildly sedated but able to meaningfully participate in the post-anesthesia evaluation    Nausea/Vomiting: none    Cardiopulmonary/Hydration status: stable euvolemic    Complications: no apparent anesthesia related complications    Postop vital signs: stable    Dental Exam: Unchanged from Preop    Patient to be discharged from PACU when criteria met.

## 2025-05-01 NOTE — BRIEF OP NOTE
Pre-Operative Diagnosis: Invasive carcinoma of breast (HCC) [C50.919]     Post-Operative Diagnosis: * No post-op diagnosis entered *      Procedure Performed:   Left breast wire localized lumpectomy, left lymphoscintigraphy, left sentinel lymph node biopsy    Surgeons and Role:     * Kassie Johnson MD - Primary    Assistant(s):   Lucero Grimm     Surgical Findings: Clip seen on xray, L SLN x1     Specimen: L lumpectomy, L margins x6, L SLN x1     Estimated Blood Loss: 5cc    Kassie Johnson MD  5/1/2025  10:05 AM

## 2025-05-01 NOTE — PROCEDURES
[facility]   part of [facility]  Procedure Note    Lay Michaels Patient Status:  Outpatient    3/7/1955 MRN [id number]   Location [facility] MAMMOGRAPHY Attending Kassie Johnson MD    Day # 0 PCP Vu Pwoell MD     Procedure: left breast wire localization    Pre-Procedure Diagnosis:  left breast cancer    Post-Procedure Diagnosis: same    Anesthesia:  Local    Findings:  wire tip adjacent to clip    Specimens: none    Blood Loss:  none    Tourniquet Time: n/a  Complications:  None  Drains:  none    Secondary Diagnosis:  none    Truman Lopez MD  2025

## 2025-05-02 ENCOUNTER — TELEPHONE (OUTPATIENT)
Age: 70
End: 2025-05-02

## 2025-05-02 NOTE — TELEPHONE ENCOUNTER
Called patient, post op day #1. States that overall doing well, pain is well controlled. She has looked at incisions and no signs of infection. Denies difficulty eating/drinking, denies nausea/vomiting. Able to move upper extremities without difficulty. Is aware of her surgical follow up appointment. Scheduled with medical oncologist, Dr. Zheng, on Tuesday May 20, 2025 at 0900 in [place] and discussed the possibility of an Oncotype test being ordered. Phone number provided and encouraged patient to call back with any questions or concerns.

## 2025-05-07 ENCOUNTER — NURSE NAVIGATOR ENCOUNTER (OUTPATIENT)
Age: 70
End: 2025-05-07

## 2025-05-07 ENCOUNTER — TELEPHONE (OUTPATIENT)
Age: 70
End: 2025-05-07

## 2025-05-07 NOTE — TELEPHONE ENCOUNTER
[email or web address] regarding providing an update for the patient from yesterday's multidisciplinary conference. Update includes Oncotype DX test and radiation consultation appointment.     Awaiting phone call back from patient.

## 2025-05-07 NOTE — TELEPHONE ENCOUNTER
Patient called back and we discussed yesterday multidisciplinary conference updates of Oncotype DX test, radiation consultation, and reviewed her surgical pathology results. Discussed what Oncotype DX test is and that the navigators will update her with results once they are received. Stated that she will receive a phone call from the radiation department to schedule her consultation appointment. Answered her questions to the best of my ability. She thanked me for the phone call and assistance. Encouraged her to call the breast RN navigators for further questions or concerns.

## 2025-05-08 ENCOUNTER — OFFICE VISIT (OUTPATIENT)
Dept: SURGERY | Facility: CLINIC | Age: 70
End: 2025-05-08
Payer: MEDICARE

## 2025-05-08 VITALS
DIASTOLIC BLOOD PRESSURE: 80 MMHG | OXYGEN SATURATION: 92 % | WEIGHT: 146 LBS | HEART RATE: 63 BPM | RESPIRATION RATE: 18 BRPM | SYSTOLIC BLOOD PRESSURE: 134 MMHG | BODY MASS INDEX: 26 KG/M2

## 2025-05-08 DIAGNOSIS — C50.919 INVASIVE CARCINOMA OF BREAST (HCC): Primary | ICD-10-CM

## 2025-05-08 PROCEDURE — 99024 POSTOP FOLLOW-UP VISIT: CPT

## 2025-05-08 PROCEDURE — 1126F AMNT PAIN NOTED NONE PRSNT: CPT

## 2025-05-08 PROCEDURE — 1159F MED LIST DOCD IN RCRD: CPT

## 2025-05-08 PROCEDURE — 1160F RVW MEDS BY RX/DR IN RCRD: CPT

## 2025-05-13 ENCOUNTER — OFFICE VISIT (OUTPATIENT)
Dept: SURGERY | Facility: CLINIC | Age: 70
End: 2025-05-13
Payer: MEDICARE

## 2025-05-13 VITALS
HEART RATE: 68 BPM | BODY MASS INDEX: 26 KG/M2 | DIASTOLIC BLOOD PRESSURE: 71 MMHG | SYSTOLIC BLOOD PRESSURE: 122 MMHG | WEIGHT: 147 LBS | OXYGEN SATURATION: 100 % | RESPIRATION RATE: 16 BRPM

## 2025-05-13 DIAGNOSIS — C50.919 INVASIVE CARCINOMA OF BREAST (HCC): Primary | ICD-10-CM

## 2025-05-13 PROCEDURE — 1125F AMNT PAIN NOTED PAIN PRSNT: CPT | Performed by: SURGERY

## 2025-05-13 PROCEDURE — 1160F RVW MEDS BY RX/DR IN RCRD: CPT | Performed by: SURGERY

## 2025-05-13 PROCEDURE — 99024 POSTOP FOLLOW-UP VISIT: CPT | Performed by: SURGERY

## 2025-05-13 PROCEDURE — 1159F MED LIST DOCD IN RCRD: CPT | Performed by: SURGERY

## 2025-05-13 NOTE — PROGRESS NOTES
Breast Surgery Post-Operative Visit    Diagnosis: Invasive ductal carcinoma, left breast, s/p lumpectomy with SLNB on 5/1/2025    Stage: Cancer Staging   No matching staging information was found for the patient.      Disease Status:  Surgical treatment complete, medical and radiation oncology recommendations pending.    History of Present Illness:   Ms. Lay Michaels is a 70 year old woman who presents with an imaging detected concern of the left breast.  The patient denies any palpable masses, nipple discharge, skin changes or axillary symptoms.  She does not have any known family history of breast cancer.  She has no personal prior history of breast disease or biopsies.  She had a screening mammogram in January 2024 that was unremarkable.  She presented this year for screening mammogram on February 25, 2025 and was found to have a focal asymmetry in the upper outer left breast.  She had a left diagnostic evaluation on March 17, 2025 and was confirmed to have a 7 mm spiculated suspicious nodule at 2:00, 6 cm from the nipple for which biopsy was recommended.  She had a biopsy in March 24, 2025 and was confirmed to have invasive ductal carcinoma, grade 1, ER 95%, MD 0%, HER2/dari negative.  She underwent lumpectomy with SLNB, which occurred without complication. She is here for postoperative visit. She reports her pain is under control. She denies erythema, warmth, drainage, or fevers.  She is here today for evaluation and recommendations for further therapy.        Past Medical History:    Allergic rhinitis    Allergic rhinitis due to other allergen    Arrhythmia    LEFT BBB    Asthma (HCC)    Back pain    Cancer (HCC)    BREAST    Cataract    Constipation    Family history of other cardiovascular diseases    H/O colonoscopy    Hemorrhoids    Leaking of urine    Macular degeneration    Renal disorder    CKD 3- PT DENIES    Tubular adenoma of colon    Urinary tract infection, site not specified    Visual impairment     MACULAR DEGENERATION, DRY EYES,    Wears glasses       Past Surgical History:   Procedure Laterality Date    Colonoscopy  2015    Tubular adenoma, recheck 5 years    Colonoscopy  2021    Diverticulosis, recheck 5 years          Oral surgery      Other surgical history  2008    C-scope  int hemorrhoids    Other surgical history  2018    LAPAROSCOPIC BILATERAL SALPINGO OOPHORECTOMY WITH LEFT CYSTECTOMY       Gynecological History:  Pt is a   Pt was 23 years old at time of first pregnancy.    She denies any cumulative breastfeeding history  She achieved menarche at age 13 and LMP age 57  Age of Menopause: 57  Type: natural menopause  She denies any history of hormone replacement therapy  She has history of oral contraceptive use for 10 years, last in .  She denies infertility treatment to achieve pregnancy.    Medications:     HYDROcodone-acetaminophen 5-325 MG Oral Tab Take 1-2 tablets by mouth every 6 (six) hours as needed for Pain. 20 tablet 0    ketorolac 0.5 % Ophthalmic Solution Place 1 drop into both eyes in the morning and 1 drop at noon and 1 drop in the evening and 1 drop before bedtime. As needed.      Lutein 15-0.7 MG Oral Cap Take 1 capsule by mouth in the morning.      Cholecalciferol (VITAMIN D) 50 MCG (2000 UT) Oral Tab Take by mouth.      Vitamin B12 100 MCG Oral Tab Take 1 tablet (100 mcg total) by mouth in the morning.      Calcium 500-125 MG-UNIT Oral Tab Take by mouth.      Docusate Sodium (STOOL SOFTENER OR) Take by mouth in the morning.      Multiple Vitamin (DAILY VITAMINS) Oral Tab Take 1 tablet by mouth in the morning.         Allergies:    Allergies[1]    Family History:   Family History   Problem Relation Age of Onset    Cancer Father         Lung, lymphoma, uterine, kidney    Other (HTN) Father     Other (AAA) Father     Other (Mitral Valve Replacement) Father     Cancer Mother         Kidney    Other (Diverticulitis) Mother     Other (HTN) Mother      Ulcerative Colitis Son     Stroke Paternal Grandmother     Ulcerative Colitis Sister     Crohn's Disease Sister     Bleeding Disorders Sister     Colon Polyps Sister     Crohn's Disease Brother     Alcohol and Other Disorders Associated Brother     Colon Cancer Paternal Aunt     Colon Cancer Paternal Uncle        She is not of Ashkenazi Protestant ancestry.    Social History:  History   Alcohol Use    5.0 standard drinks of alcohol/week    2 Glasses of wine, 3 Standard drinks or equivalent per week     Comment: Occasional        History   Smoking Status    Never   Smokeless Tobacco    Never     Ms. Lay Michaels is  with 3 children. She has 5 siblings. She is currently Retired    Review of Systems:  General:   The patient denies, fever, chills, night sweats, fatigue, generalized weakness, change in appetite or weight loss.    HEENT:     The patient denies +eye irritation, +cataracts, redness, glaucoma, yellowing of the eyes, change in vision, color blindness, or +wearing contacts/glasses. The patient denies hearing loss, ringing in the ears, ear drainage, earaches, nasal congestion, nose bleeds, +snoring, pain in mouth/throat, hoarseness, change in voice, facial trauma.    Respiratory:  The patient denies chronic cough, phlegm, hemoptysis, pleurisy/chest pain, pneumonia, +asthma, wheezing, difficulty in breathing with exertion, emphysema, chronic bronchitis, shortness of breath or abnormal sound when breathing.     Cardiovascular:  There is no history of chest pain, chest pressure/discomfort, palpitations, irregular heartbeat, fainting or near-fainting, difficulty breathing when lying flat, SOB/Coughing at night, swelling of the legs or chest pain while walking.    Breasts:  See history of present illness    Gastrointestinal:     There is no history of difficulty or pain with swallowing, reflux symptoms, vomiting, dark or bloody stools, constipation, yellowing of the skin, indigestion, nausea, change in bowel  habits, diarrhea, abdominal pain or vomiting blood.     Genitourinary:  The patient denies +frequent urination, +needing to get up at night to urinate, urinary hesitancy or retaining urine, painful urination, +urinary incontinence, decreased urine stream, blood in the urine or vaginal/penile discharge.    Skin:    The patient denies rash, itching, skin lesions, dry skin, change in skin color or change in moles.     Hematologic/Lymphatic:  The patient denies easily bruising or bleeding or persistent swollen glands or lymph nodes.     Musculoskeletal:  The patient denies muscle aches/pain, joint pain, stiff joints, neck pain, back pain or bone pain.    Neuropsychiatric:  There is no history of migraines or severe headaches, seizure/epilepsy, speech problems, coordination problems, trembling/tremors, fainting/black outs, dizziness, memory problems, loss of sensation/numbness, problems walking, weakness, tingling or burning in hands/feet. There is no history of abusive relationship, bipolar disorder, sleep disturbance, anxiety, depression or feeling of despair.    Endocrine:    There is no history of poor/slow wound healing, weight loss/gain, fertility or hormone problems, cold intolerance, thyroid disease.     Allergic/Immunologic:  There is no history of hives, hay fever, angioedema or anaphylaxis.    /80 (BP Location: Right arm, Patient Position: Sitting, Cuff Size: adult)   Pulse 63   Resp 18   Wt 66.2 kg (146 lb)   LMP 12/23/2013   SpO2 92%   BMI 25.86 kg/m²     Physical Exam:  The patient is an alert, oriented, well-nourished and  well-developed woman who appears her stated age. Her speech patterns and movements are normal. Her affect is appropriate.    HEENT: The head is normocephalic. The neck is supple. The thyroid is not enlarged and is without palpable masses/nodules. There are no palpable masses. The trachea is in the midline. Conjunctiva are clear, non-icteric.    Chest: The chest expands  symmetrically. The lungs are clear to auscultation.    Heart: The rhythm is regular.  There are no murmurs, rubs, gallops or thrills.    Breasts:  Her breasts are symmetrical with a cup size 36C.  Right breast: The skin, nipple ,and areola appear normal. There is no skin dimpling with movement of the pectoralis. There is no nipple retraction. No nipple discharge can be elicited. The parenchyma is mildly nodular. There are no dominant masses in the breast. The axillary tail is normal.  Left breast:   The skin, nipple, and areola appear normal. There is no skin dimpling with movement of the pectoralis. There is no nipple retraction. No nipple discharge can be elicited. The parenchyma is mildly nodular. There are no dominant masses in the breast. The axillary tail is normal.    Abdomen:  The abdomen is soft, flat and non tender. The liver is not enlarged. There are no palpable masses.    Lymph Nodes:  The supraclavicular, axillary and cervical regions are free of significant lymphadenopathy.    Back: There is no vertebral column tenderness.    Skin: The skin appears normal. There are no suspicious appearing rashes or lesions.    Extremities: The extremities are without deformity, cyanosis or edema.    Impression:   Ms. Lay Michaels is a 70 year old woman presents with imaging detected left breast cancer, clinical stage T1 NX MX, s/p lumpectomy with SLNB    Recommendations:   I had a discussion with the Patient regarding her breast exam.  She is healing well since surgery with no signs of infection. I  reviewed her pathology. She will follow up with Dr. Johnson for a second post operative visit. I encouraged her to continue monitoring her ROM and strength and explained that a referral to physical therapy may be warranted in the future if she identifies any limitations or restrictions. She was given ample opportunity for questions and those questions were answered to her satisfaction. She was encouraged to contact the  office with any questions or concerns prior to her next scheduled appointment.       This note was created by [employer] voice recognition. Errors in content may be related to improper recognition by the system; efforts to review and correct have been done but errors may still exist. Please be advised the primary purpose of this note is for me to communicate medical care. Standard sentence structure is not always used. Medical terminology and medical abbreviations may be used. There may be grammatical, typographical, and automated fill ins that may have errors missed in proofreading.           [1]   Allergies  Allergen Reactions    Penicillin V Potassium [Penicillin V] RASH    Penicillins RASH

## 2025-05-14 ENCOUNTER — TELEPHONE (OUTPATIENT)
Age: 70
End: 2025-05-14

## 2025-05-14 NOTE — TELEPHONE ENCOUNTER
Called patient and verified her full name and  and provided her Oncotype score of 14. I stated to her that there is less than 1% chemotherapy benefit and that Dr. Zheng will discuss her results in detail at her upcoming appointment on May 20, 2025 at 0900 in [place]. I stated the next steps will be to meet with Dr. Zheng and her consultation with RT. Answered her questions to the best of my ability. She thanked me for the phone call and assistance.

## 2025-05-20 ENCOUNTER — OFFICE VISIT (OUTPATIENT)
Age: 70
End: 2025-05-20
Attending: INTERNAL MEDICINE
Payer: MEDICARE

## 2025-05-20 ENCOUNTER — OFFICE VISIT (OUTPATIENT)
Dept: SURGERY | Facility: CLINIC | Age: 70
End: 2025-05-20
Payer: MEDICARE

## 2025-05-20 VITALS
RESPIRATION RATE: 18 BRPM | BODY MASS INDEX: 26 KG/M2 | HEART RATE: 73 BPM | SYSTOLIC BLOOD PRESSURE: 142 MMHG | OXYGEN SATURATION: 100 % | WEIGHT: 148.19 LBS | DIASTOLIC BLOOD PRESSURE: 80 MMHG | TEMPERATURE: 98 F

## 2025-05-20 VITALS
WEIGHT: 148 LBS | HEART RATE: 73 BPM | BODY MASS INDEX: 26 KG/M2 | DIASTOLIC BLOOD PRESSURE: 80 MMHG | SYSTOLIC BLOOD PRESSURE: 142 MMHG | OXYGEN SATURATION: 100 % | RESPIRATION RATE: 18 BRPM

## 2025-05-20 DIAGNOSIS — C50.412 MALIGNANT NEOPLASM OF UPPER-OUTER QUADRANT OF LEFT BREAST IN FEMALE, ESTROGEN RECEPTOR POSITIVE (HCC): Primary | ICD-10-CM

## 2025-05-20 DIAGNOSIS — Z17.0 MALIGNANT NEOPLASM OF UPPER-OUTER QUADRANT OF LEFT BREAST IN FEMALE, ESTROGEN RECEPTOR POSITIVE (HCC): Primary | ICD-10-CM

## 2025-05-20 PROCEDURE — 1126F AMNT PAIN NOTED NONE PRSNT: CPT | Performed by: SURGERY

## 2025-05-20 PROCEDURE — 99024 POSTOP FOLLOW-UP VISIT: CPT | Performed by: SURGERY

## 2025-05-20 RX ORDER — LETROZOLE 2.5 MG/1
2.5 TABLET, FILM COATED ORAL DAILY
Qty: 90 TABLET | Refills: 3 | Status: SHIPPED | OUTPATIENT
Start: 2025-05-20

## 2025-05-20 NOTE — CONSULTS
Cancer Center Report of Consultation    Patient Name: Lay Michaels   YOB: 1955   Medical Record Number: [id number]   CSN: [id number]   Consulting Physician: John Zheng MD  Referring Physician(s): No ref. provider found  Date of Consultation: 5/20/2025     Reason for Consultation:  Lay Michaels was seen today in the Cancer Center for evaluation and management of left breast cancer    History of Present Illness  Lay Michaels is a 70 year old female with invasive ductal carcinoma of the left breast who presents for oncology consultation following lumpectomy.    She underwent a routine screening mammogram on February 25, which revealed focal asymmetry in the upper outer quadrant of the left breast. A follow-up diagnostic mammogram and ultrasound on March 17 identified a 7 mm spiculated nodule at the 2 o'clock position in the left breast. An ultrasound-guided biopsy on March 24 confirmed invasive ductal carcinoma, grade 1.    The initial biopsy showed a very strong positive estrogen receptor, zero progesterone receptor, and inconclusive KI-67. She underwent a lumpectomy on May 1, which revealed an 11 mm invasive ductal carcinoma, grade 2, with negative sentinel lymph nodes. The final pathology showed 100% estrogen receptor and 90% progesterone receptor positivity.    No new back pain, bone pain, breathing problems, or changes in energy level or appetite. Her past medical history includes macular degeneration, slight urinary incontinence, and cataracts. She had an oophorectomy due to a cyst on one ovary and has not had menstrual periods since.    Family history is significant for her mother having kidney cancer and her father having lymphoma, lung cancer, and ureter cancer. A sister and a brother have inflammatory bowel issues, and her son has ulcerative colitis. A paternal aunt and uncle had colon cancer.    She has had a DEXA scan in July, which showed her lumbar spine at the cutoff for osteopenia with a T  score of -1.0, total hip at -0.8, and femoral neck at -1.2.    Past Medical History:  Past Medical History[1]    Past Surgical History:  Past Surgical History[2]    Family Medical History:  Family History[3]    Gyne History:  OB History    Para Term  AB Living   3 3 0 0 0 0   SAB IAB Ectopic Multiple Live Births   0 0 0 0 0       Psychosocial History:  Social History     Socioeconomic History    Marital status:      Spouse name: Not on file    Number of children: 3    Years of education: Not on file    Highest education level: Not on file   Occupational History    Not on file   Tobacco Use    Smoking status: Never     Passive exposure: Never    Smokeless tobacco: Never    Tobacco comments:     No tobacco use   Vaping Use    Vaping status: Never Used   Substance and Sexual Activity    Alcohol use: Yes     Alcohol/week: 5.0 standard drinks of alcohol     Types: 2 Glasses of wine, 3 Standard drinks or equivalent per week     Comment: Occasional     Drug use: No    Sexual activity: Yes     Partners: Male   Other Topics Concern    Caffeine Concern No    Exercise Yes    Seat Belt Yes    Special Diet No    Stress Concern No    Weight Concern Yes     Service Not Asked    Blood Transfusions Not Asked    Occupational Exposure Not Asked    Hobby Hazards Not Asked    Sleep Concern Not Asked    Back Care Not Asked    Bike Helmet Not Asked    Self-Exams Not Asked   Social History Narrative    Not on file     Social Drivers of Health     Food Insecurity: Not on file   Transportation Needs: Not on file   Housing Stability: Not on file       Allergies:   Allergies[4]    Current Medications:  Medications - Current[5]    Review of Systems:      Constitutional: Negative for anorexia, fatigue, fevers, chills, night sweats and weight loss.  Eyes: Negative for visual disturbance, irritation and redness.  Respiratory: Negative for cough, hemoptysis, chest pain, or dyspnea.  Cardiovascular: Negative for angina,  orthopnea or palpitations.  Gastrointestinal: Negative for nausea, vomiting, change in bowel habits, diarrhea, constipation and abdominal pain.  Integument/breast: Negative for rash, skin lesions, and pruritus.  Hematologic/lymphatic: Negative for easy bruising, bleeding, and lymphadenopathy.  Musculoskeletal: Negative for myalgias, arthralgias, muscle weakness.  Genitourinary: Negative for dysuria or hematuria  Neurological: Negative for headaches, dizziness, speech problems, gait problems and focal weakness.  Psychiatric: The patient's mood was calm and appropriate for this visit.    The pertinent positives and negatives were described in the HPI and above. All other systems were negative.      Vital Signs:  Height: --  Weight: 67.1 kg (148 lb) (05/20 1111)  BSA (Calculated - sq m): --  Pulse: 73 (05/20 1111)  BP: 142/80 (05/20 1111)  Temp: 97.5 °F (36.4 °C) (05/20 0902)  Do Not Use - Resp Rate: --  SpO2: 100 % (05/20 1111)    Physical Examination:    Constitutional: Patient is alert and oriented x 3, not in acute distress.  HEENT:  Oropharynx is clear. Neck is supple.  Eyes: Anicteric sclera. Pink conjunctiva.  Respiratory: Clear to auscultation and percussion. No rales.  No wheezes.  Cardiovascular: Regular rate and rhythm.   Gastrointestinal: Soft, non tender with good bowel sounds.  Extremities: No edema. No calf tenderness.  Neurological: Grossly intact without focal motor or sensory deficit.  Lymphatics: There is no palpable lymphadenopathy throughout in the cervical, supraclavicular, or axillary regions.    Labs reviewed at this visit:  Lab Results   Component Value Date    WBC 4.6 02/25/2025    RBC 4.39 02/25/2025    HGB 13.1 02/25/2025    HCT 39.9 02/25/2025    MCV 90.9 02/25/2025    MCH 29.8 02/25/2025    MCHC 32.8 02/25/2025    RDW 13.4 02/25/2025    .0 02/25/2025     Lab Results   Component Value Date     04/28/2025    K 4.8 04/28/2025     04/28/2025    CO2 26.0 04/28/2025    BUN 13  04/28/2025    CREATSERUM 1.08 (H) 04/28/2025    GLU 90 04/28/2025    CA 10.5 04/28/2025    ALKPHO 85 02/25/2025    ALT 18 02/25/2025    AST 29 02/25/2025    BILT 0.6 02/25/2025    ALB 4.9 (H) 02/25/2025    TP 8.2 02/25/2025       Results  LABS  Oncotype: 4% distant recurrence  Chemistry panel: Albumin high, Cr 1.1 (02/2025)    RADIOLOGY  Screening mammogram: Focal asymmetry in upper outer left breast (02/25/2025)  Diagnostic mammogram and ultrasound: 7 mm spiculated nodule at 2 o'clock position, upper outer left breast (03/17/2025)    DIAGNOSTIC  DEXA scan: Lumbar spine T score -1.0, total hip T score -0.8, femoral neck T score -1.2 (07/2024)    PATHOLOGY  Ultrasound biopsy: Invasive ductal carcinoma, grade 1, ER strong positive, ND 0, KI-67 inconclusive (03/24/2025)  Left breast lumpectomy: 1.1 cm invasive ductal carcinoma, grade 2, %, ND 90%, 2 sentinel lymph nodes negative (05/01/2025)     Radiologic imaging reviewed at this visit:    Mammogram on 3/17/2025:  BREAST COMPOSITION:  Category B - The breasts have scattered areas of fibroglandular density.     FINDINGS:       Mammographic findings:  There is a subtle spiculated focal asymmetry within the upper-outer quadrant of the left breast posteriorly.  Architectural distortion is noted.     Focal left breast ultrasound:  There is a sonographic correlate for the mammographic finding.  At the 2 o'clock position 6-7 cm from the left nipple, there is a spiculated hypoechoic nodule with shadowing measuring 6 x 7 x 6 mm.     Impression   CONCLUSION:  Ultrasound-guided biopsy of the 7 mm spiculated hypoechoic nodule at the 2 o'clock position in the left breast is recommended for further assessment.     BI-RADS CATEGORY:    DIAGNOSTIC CATEGORY 4C - SUSPICIOUS (HIGH SUSPICION FOR MALIGNANCY)       RECOMMENDATIONS:    ULTRASOUND-GUIDED BIOPSY: LEFT BREAST           Assessment & Plan  Invasive ductal carcinoma of left breast upper outer quadrant:  Lumpectomy on  5/1/2025  IDC 11 mm, grade II  SLN 0/2  %  RI >90%  Ki-67 5%  Her2 IHC 0  Oncotype Dx RS 14    Invasive ductal carcinoma of the left breast, initially detected as a 7 mm spiculated nodule in the upper outer quadrant, confirmed by biopsy as grade 1. Post-lumpectomy pathology revealed an 11 mm grade 2 invasive ductal carcinoma with negative sentinel lymph nodes. Estrogen receptor 100% positive, progesterone receptor 90% positive, HER2 negative, and KI-67 at 5%. Oncotype DX score indicates a low risk of distant recurrence (4% over 10 years). Discussed radiation therapy to reduce local recurrence and the benefits of aromatase inhibitors in reducing distant recurrence. Engaged in shared decision-making regarding radiation and endocrine therapy options, including risks and benefits of tamoxifen versus aromatase inhibitors. Radiation therapy is standard post-lumpectomy to reduce local recurrence, but emerging data suggest it may not be necessary for all patients. The ALEN study is an option for further evaluation of radiation necessity. Aromatase inhibitors are preferred due to lower risk of blood clots and uterine cancer compared to tamoxifen, but may cause arthralgias and affect bone density.  - Prescribe letrozole for 5 years.  - Refer to radiation oncology for consultation regarding potential radiation therapy.  - Follow up with DEXA scan in 2 years to monitor bone density.  - Discuss potential enrollment in the ALEN study with radiation oncologist.    Osteopenia  Osteopenia with lumbar spine T-score of -1.0 and femoral neck T-score of -1.2. Discussed the impact of aromatase inhibitors on bone density and the importance of monitoring bone health. Calcium and vitamin D intake should be adequate to support bone health.  - Monitor bone density with DEXA scan in 2 years.  - Ensure adequate intake of calcium and vitamin D.    Follow-up  Follow-up plans for ongoing management and monitoring of breast cancer and bone  health.  - Schedule follow-up appointment in 6 months to assess response to letrozole and monitor for side effects.  - Coordinate with radiation oncology for potential radiation therapy schedule.         The following individual(s) verbally consented to be recorded using ambient AI listening technology and understand that they can each withdraw their consent to this listening technology at any point by asking the clinician to turn off or pause the recording:    Patient name: Lay Michaels  Additional names:  Sandeep Michaels        John Zheng MD        [1]   Past Medical History:   Allergic rhinitis    Arrhythmia    LEFT BBB    Asthma (HCC)    Cancer (HCC)    BREAST    Cataract    Family history of other cardiovascular diseases    H/O colonoscopy    Hemorrhoids    Leaking of urine    Macular degeneration    Renal disorder    CKD 3- PT DENIES    Tubular adenoma of colon    Visual impairment    MACULAR DEGENERATION, DRY EYES,    Wears glasses   [2]   Past Surgical History:  Procedure Laterality Date    Colonoscopy  2015    Tubular adenoma, recheck 5 years    Colonoscopy  2021    Diverticulosis, recheck 5 years          Oral surgery      Other surgical history  2008    C-scope  int hemorrhoids    Other surgical history  2018    LAPAROSCOPIC BILATERAL SALPINGO OOPHORECTOMY WITH LEFT CYSTECTOMY   [3]   Family History  Problem Relation Age of Onset    Cancer Mother         Kidney    Other (Diverticulitis) Mother     Other (HTN) Mother     Cancer Father         Lung, lymphoma,, kidney/ureter    Other (HTN) Father     Other (AAA) Father     Other (Mitral Valve Replacement) Father     Ulcerative Colitis Sister     Crohn's Disease Sister     Bleeding Disorders Sister     Colon Polyps Sister     Crohn's Disease Brother     Alcohol and Other Disorders Associated Brother     Stroke Paternal Grandmother     Ulcerative Colitis Son     Colon Cancer Paternal Aunt     Colon Cancer Paternal Uncle    [4]    Allergies  Allergen Reactions    Penicillin V Potassium [Penicillin V] RASH    Penicillins RASH   [5]   Current Outpatient Medications:     letrozole 2.5 MG Oral Tab, Take 1 tablet (2.5 mg total) by mouth daily., Disp: 90 tablet, Rfl: 3    Lutein 15-0.7 MG Oral Cap, Take 1 capsule by mouth in the morning., Disp: , Rfl:     Cholecalciferol (VITAMIN D) 50 MCG (2000 UT) Oral Tab, Take by mouth., Disp: , Rfl:     Vitamin B12 100 MCG Oral Tab, Take 1 tablet (100 mcg total) by mouth in the morning., Disp: , Rfl:     Calcium 500-125 MG-UNIT Oral Tab, Take by mouth., Disp: , Rfl:     Docusate Sodium (STOOL SOFTENER OR), Take by mouth in the morning., Disp: , Rfl:     Multiple Vitamin (DAILY VITAMINS) Oral Tab, Take 1 tablet by mouth in the morning., Disp: , Rfl:

## 2025-05-28 ENCOUNTER — DOCUMENTATION ONLY (OUTPATIENT)
Dept: RADIATION ONCOLOGY | Facility: HOSPITAL | Age: 70
End: 2025-05-28

## 2025-05-28 ENCOUNTER — HOSPITAL ENCOUNTER (OUTPATIENT)
Dept: RADIATION ONCOLOGY | Facility: HOSPITAL | Age: 70
Discharge: HOME | End: 2025-05-28
Attending: RADIOLOGY
Payer: MEDICARE

## 2025-05-28 VITALS
HEART RATE: 67 BPM | OXYGEN SATURATION: 98 % | BODY MASS INDEX: 26 KG/M2 | RESPIRATION RATE: 16 BRPM | WEIGHT: 148.81 LBS | DIASTOLIC BLOOD PRESSURE: 86 MMHG | TEMPERATURE: 98 F | SYSTOLIC BLOOD PRESSURE: 147 MMHG

## 2025-05-28 DIAGNOSIS — C50.919 INVASIVE CARCINOMA OF BREAST (HCC): Primary | ICD-10-CM

## 2025-05-28 PROCEDURE — 99214 OFFICE O/P EST MOD 30 MIN: CPT

## 2025-05-28 NOTE — PATIENT INSTRUCTIONS
- IF YOU HAVE ANY QUESTIONS OR CONCERNS REGARDING RADIATION THERAPY, PLEASE CALL the nursing line [phone].

## 2025-05-28 NOTE — CONSULTS
[facility]  RADIATION ONCOLOGY  CONSULTATION     PATIENT:   Lay Michaels        REFERRING MD:  TAMICA Zheng MD  DIAGNOSIS:   Left breast cancer      HPI   69 yo here with     Mammogram 2025:  focal asymmetry in L UOQ  Dx imaging 3/2025: 7 mm nodule 2:00 L breast    US guided biopsy 2:00 left breast 3/24/2025: ER 95 TN 0 Her2 0 grade 1 IDC  BCS and SNBx 2025:   TN 90 Ki 5% Her2 0 grade 1 IDC, 11 mm span. Small focus of grade 2 DCIS. No LVI. No LCIS.  Shave margins are negative.  2 nodes negative. Oncotype DX RS is 14.    Plans 5 years of AI after med onc consult.    PMH  -asthma, breast cancer, hemorrhoids, colon polyps    PSH  -BSO 2018    MEDS   Calcium 500-125 MG-UNIT Oral Tab Take by mouth.    Cholecalciferol (VITAMIN D) 50 MCG (2000 UT) Oral Tab Take by mouth.    Docusate Sodium (STOOL SOFTENER OR) Daily    letrozole (FEMARA) 2.5 mg, Oral, Daily    Lutein 15-0.7 MG Oral Cap 1 capsule, Daily    Multiple Vitamin (DAILY VITAMINS) Oral Tab 1 tablet, Daily    Vitamin B12 (CYANOCOBALAMIN) 100 mcg, Daily     SH  -    FH  -mother had kidney cancer    ROS  -pertinent items in HPI.     PHYSICAL EXAM   ECO  GENERAL: 148 pounds.  HEENT: No LAD.  CHEST: Regular rate and rhythm.    Clear to auscultation.   ABD:  Soft, non-tender.  EXT:  No edema.  NEURO: Alert and oriented.    IMPRESSION/PLAN   69 yo s/p BCS, shave margins, SN Bx for left UOQ breast cancer  11 mm grade 1 IDC ER 95 TN 0 I 5% Her2 0 without LVI or high risk DCIS  2 negative nodes    She plans 5 years of AI  Her risk of local recurrence is very low - on the low end of the low risk group  She is not interested in a small statistical improvement in local control as it would not affect survival  I 100% support this decision    She will follow up with med onc and breast surgeon    Gail Webb MD  Radiation Oncology    CC: TAMICA Johnson MD    30 minutes were spent with the patient, more than 50 percent on  counseling/coordination of care (discuss disease status, goals of therapy, methods of radiation therapy, side effect discussion, role of RT in overall care)

## 2025-05-28 NOTE — PROGRESS NOTES
Nursing Consultation Note  Patient: Lay Michaels  YOB: 1955  Age: 70 year old  Radiation Oncologist: Dr. Gail Webb  Referring Physician: Kassie Johnson  Diagnosis:[unfilled]  Consult Date: 5/28/2025      Chemotherapy: N/A  Labs: Reviewed  Imaging: Reviewed  Is the patient of child-bearing age?         No  Has the patient received radiation therapy in the past? no  Does the patient have an implantable device?No   Patient has/has had:     1. Assistive Devices: N/A    2. Flu Vaccination: yes    3. Pneumonia Vaccination:  yes    Vital Signs:   Vitals:    05/28/25 0833   BP: 147/86   Pulse: 67   Resp: 16   Temp: 98.4 °F (36.9 °C)   ,   Wt Readings from Last 6 Encounters:   05/28/25 67.5 kg (148 lb 12.8 oz)   05/20/25 67.1 kg (148 lb)   05/20/25 67.2 kg (148 lb 3.2 oz)   05/13/25 66.7 kg (147 lb)   05/08/25 66.2 kg (146 lb)   05/01/25 65.9 kg (145 lb 4.5 oz)       Nursing Note: Hx of left breast cancer. Asymmetry found on screening mammography 2/25/25. Biopsy done 3/24/25- path IDC, grade 1. S/p lumpectomy 5/1/25- 11mm IDC, grade 2, negative SLN (0/2), %, KS 90%, HER2 IHC 0, Ki67 5%, margins negative, no LVI. Oncotype DX 14. Saw Dr. Zheng on 5/20/25. Plan for letrozole. Pt feeling well post op. Pt reports two seromas that are being monitored. Pt reports good ROM of UE. Denies pain needing pain meds. Saw Dr. Johnson in follow up 5/20/25.    Review of Systems   Constitutional: Negative.    HENT: Negative.     Eyes: Negative.    Respiratory: Negative.     Cardiovascular: Negative.    Gastrointestinal: Negative.    Endocrine: Negative.    Genitourinary: Negative.    Musculoskeletal: Negative.    Skin: Negative.    Allergic/Immunologic: Negative.    Neurological: Negative.    Hematological: Negative.    Psychiatric/Behavioral: Negative.           Allergies:  Allergies[1]    Current Medications[2]    Preferred Pharmacy:    [id number] - [place], [place] - [address] AT [facility] [address], [phone], [phone]  [address] [facility] [address]  Phone: [phone] Fax: [phone]    [id number] - [place], [place] - [address] [facility] AT CORNER OF [facility], [phone], [phone]  [address] [facility] [address]  Phone: [phone] Fax: [phone]      Past Medical History[3]    Past Surgical History[4]    Social History     Socioeconomic History    Marital status:      Spouse name: Not on file    Number of children: 3    Years of education: Not on file    Highest education level: Not on file   Occupational History    Not on file   Tobacco Use    Smoking status: Never     Passive exposure: Never    Smokeless tobacco: Never    Tobacco comments:     No tobacco use   Vaping Use    Vaping status: Never Used   Substance and Sexual Activity    Alcohol use: Yes     Alcohol/week: 5.0 standard drinks of alcohol     Types: 2 Glasses of wine, 3 Standard drinks or equivalent per week     Comment: Occasional     Drug use: No    Sexual activity: Yes     Partners: Male   Other Topics Concern    Caffeine Concern No    Exercise Yes    Seat Belt Yes    Special Diet No    Stress Concern No    Weight Concern Yes     Service Not Asked    Blood Transfusions Not Asked    Occupational Exposure Not Asked    Hobby Hazards Not Asked    Sleep Concern Not Asked    Back Care Not Asked    Bike Helmet Not Asked    Self-Exams Not Asked   Social History Narrative    - lives with     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      Education:  Knowledge Deficit Plan Of Care:    Problem:  Knowledge Deficit    Problems related to:    Radiation therapy    Interventions:  Assess patient knowledge level  Assess  knowledge needs  Instruct on treatment planning  Instruct on radiation therapy appointment scheduling  Instruct on purpose of radiation therapy  Instruct on side effects of radiation therapy    Expected Outcomes:  Knowledge of radiation therapy    Progress Toward Outcome:  Making progress    Pamphlets/Handouts Given to Patient:  Understanding radiation therapy      Are ADL's met?  Yes  Does patient feel safe in their environment?  Yes  Care decisions:  Patient and/or surrogate IS involved in care decisions.  Advanced directives:  Patient DOES NOT have advanced directives.  Transportation:  Adequate transportation available for expected visits    Pain: pt denies         [1]   Allergies  Allergen Reactions    Penicillin V Potassium [Penicillin V] RASH    Penicillins RASH   [2]   Current Outpatient Medications   Medication Sig Dispense Refill    Lutein 15-0.7 MG Oral Cap Take 1 capsule by mouth in the morning.      Cholecalciferol (VITAMIN D) 50 MCG (2000 UT) Oral Tab Take by mouth.      Vitamin B12 100 MCG Oral Tab Take 1 tablet (100 mcg total) by mouth in the morning.      Calcium 500-125 MG-UNIT Oral Tab Take by mouth.      Docusate Sodium (STOOL SOFTENER OR) Take by mouth in the morning.      Multiple Vitamin (DAILY VITAMINS) Oral Tab Take 1 tablet by mouth in the morning.      letrozole 2.5 MG Oral Tab Take 1 tablet (2.5 mg total) by mouth daily. (Patient not taking: Reported on 5/28/2025) 90 tablet 3   [3]   Past Medical History:   Allergic rhinitis    Arrhythmia    LEFT BBB    Asthma (HCC)    Cancer (HCC)    BREAST    Cataract    Family history of other cardiovascular diseases    H/O colonoscopy    Hemorrhoids    Leaking of urine    Macular degeneration    Renal disorder    CKD 3- PT DENIES    Tubular adenoma of colon    Visual impairment    MACULAR DEGENERATION, DRY EYES,    Wears glasses   [4]   Past Surgical History:  Procedure Laterality Date    Colonoscopy  04/23/2015    Tubular adenoma, recheck 5 years     Colonoscopy  2021    Diverticulosis, recheck 5 years          Oral surgery      Other surgical history  2008    C-scope  int hemorrhoids    Other surgical history  2018    LAPAROSCOPIC BILATERAL SALPINGO OOPHORECTOMY WITH LEFT CYSTECTOMY

## 2025-05-30 ENCOUNTER — SOCIAL WORK SERVICES (OUTPATIENT)
Age: 70
End: 2025-05-30

## 2025-05-30 NOTE — PROGRESS NOTES
reviewed chart, noting 4 on Distress Screen with Practical, Emotional, and Social concerns.  sent detailed MyChart message, offering introduction and support.  educated on FMLA/STD benefits, encouraging patient/family to confirm benefits with employer if needed. Provided Forms Department flyer as well for any paperwork that may be required.  educated on Power of  for Healthcare, providing document for review; Patient is aware to reach out if they choose to complete. Educated on available resources, providing Social Work Support Packet including Cancer Center Support Services, [facility], Transportation, and Home Care contacts. Educated on BHI if desired. Contact provided and family is aware to reach out with any needs.

## 2025-06-01 NOTE — PROGRESS NOTES
Breast Surgery Post-Operative Visit    Diagnosis: Invasive ductal carcinoma, left breast, s/p lumpectomy with SLNB on 5/1/2025    Stage: T1 cN0 MX    Disease Status:  Surgical treatment complete, letrozole per medical oncology, and radiation oncology recommendations pending.    History of Present Illness:   Ms. Lay Michaels is a 70 year old woman who presents with an imaging detected concern of the left breast.  The patient denies any palpable masses, nipple discharge, skin changes or axillary symptoms.  She does not have any known family history of breast cancer.  She has no personal prior history of breast disease or biopsies.  She had a screening mammogram in January 2024 that was unremarkable.  She presented this year for screening mammogram on February 25, 2025 and was found to have a focal asymmetry in the upper outer left breast.  She had a left diagnostic evaluation on March 17, 2025 and was confirmed to have a 7 mm spiculated suspicious nodule at 2:00, 6 cm from the nipple for which biopsy was recommended.  She had a biopsy in March 24, 2025 and was confirmed to have invasive ductal carcinoma, grade 1, ER 95%, WI 0%, HER2/dari negative.  She underwent lumpectomy with SLNB, which occurred without complication. She is here for postoperative visit. She reports her pain is under control. She denies erythema, warmth, drainage, or fevers.She is here today for evaluation and recommendations for further therapy.        Past Medical History:    Allergic rhinitis    Arrhythmia    LEFT BBB    Asthma (HCC)    Cancer (HCC)    BREAST    Cataract    Family history of other cardiovascular diseases    H/O colonoscopy    Hemorrhoids    Leaking of urine    Macular degeneration    Renal disorder    CKD 3- PT DENIES    Tubular adenoma of colon    Visual impairment    MACULAR DEGENERATION, DRY EYES,    Wears glasses       Past Surgical History:   Procedure Laterality Date    Colonoscopy  04/23/2015    Tubular adenoma, recheck 5  years    Colonoscopy  2021    Diverticulosis, recheck 5 years          Oral surgery      Other surgical history  2008    C-scope  int hemorrhoids    Other surgical history  2018    LAPAROSCOPIC BILATERAL SALPINGO OOPHORECTOMY WITH LEFT CYSTECTOMY       Gynecological History:  Pt is a   Pt was 23 years old at time of first pregnancy.    She denies any cumulative breastfeeding history  She achieved menarche at age 13 and LMP age 57  Age of Menopause: 57  Type: natural menopause  She denies any history of hormone replacement therapy  She has history of oral contraceptive use for 10 years, last in .  She denies infertility treatment to achieve pregnancy.    Medications:     letrozole 2.5 MG Oral Tab Take 1 tablet (2.5 mg total) by mouth daily. (Patient not taking: Reported on 2025) 90 tablet 3    Lutein 15-0.7 MG Oral Cap Take 1 capsule by mouth in the morning.      Cholecalciferol (VITAMIN D) 50 MCG (2000 UT) Oral Tab Take by mouth.      Vitamin B12 100 MCG Oral Tab Take 1 tablet (100 mcg total) by mouth in the morning.      Calcium 500-125 MG-UNIT Oral Tab Take by mouth.      Docusate Sodium (STOOL SOFTENER OR) Take by mouth in the morning.      Multiple Vitamin (DAILY VITAMINS) Oral Tab Take 1 tablet by mouth in the morning.         Allergies:    Allergies[1]    Family History:   Family History   Problem Relation Age of Onset    Cancer Father         Lung, lymphoma,, kidney/ureter    Other (HTN) Father     Other (AAA) Father     Other (Mitral Valve Replacement) Father     Cancer Mother         Kidney    Other (Diverticulitis) Mother     Other (HTN) Mother     Ulcerative Colitis Son     Stroke Paternal Grandmother     Ulcerative Colitis Sister     Crohn's Disease Sister     Bleeding Disorders Sister     Colon Polyps Sister     Crohn's Disease Brother     Alcohol and Other Disorders Associated Brother     Colon Cancer Paternal Aunt     Colon Cancer Paternal Uncle        She is not of  Ashkenazi [place] ancestry.    Social History:  History   Alcohol Use    5.0 standard drinks of alcohol/week    2 Glasses of wine, 3 Standard drinks or equivalent per week     Comment: Occasional        History   Smoking Status    Never   Smokeless Tobacco    Never     Ms. Lay Michaels is  with 3 children. She has 5 siblings. She is currently Retired    Review of Systems:  General:   The patient denies, fever, chills, night sweats, fatigue, generalized weakness, change in appetite or weight loss.    HEENT:     The patient denies +eye irritation, +cataracts, redness, glaucoma, yellowing of the eyes, change in vision, color blindness, or +wearing contacts/glasses. The patient denies hearing loss, ringing in the ears, ear drainage, earaches, nasal congestion, nose bleeds, +snoring, pain in mouth/throat, hoarseness, change in voice, facial trauma.    Respiratory:  The patient denies chronic cough, phlegm, hemoptysis, pleurisy/chest pain, pneumonia, +asthma, wheezing, difficulty in breathing with exertion, emphysema, chronic bronchitis, shortness of breath or abnormal sound when breathing.     Cardiovascular:  There is no history of chest pain, chest pressure/discomfort, palpitations, irregular heartbeat, fainting or near-fainting, difficulty breathing when lying flat, SOB/Coughing at night, swelling of the legs or chest pain while walking.    Breasts:  See history of present illness    Gastrointestinal:     There is no history of difficulty or pain with swallowing, reflux symptoms, vomiting, dark or bloody stools, constipation, yellowing of the skin, indigestion, nausea, change in bowel habits, diarrhea, abdominal pain or vomiting blood.     Genitourinary:  The patient denies +frequent urination, +needing to get up at night to urinate, urinary hesitancy or retaining urine, painful urination, +urinary incontinence, decreased urine stream, blood in the urine or vaginal/penile discharge.    Skin:    The patient denies  rash, itching, skin lesions, dry skin, change in skin color or change in moles.     Hematologic/Lymphatic:  The patient denies easily bruising or bleeding or persistent swollen glands or lymph nodes.     Musculoskeletal:  The patient denies muscle aches/pain, joint pain, stiff joints, neck pain, back pain or bone pain.    Neuropsychiatric:  There is no history of migraines or severe headaches, seizure/epilepsy, speech problems, coordination problems, trembling/tremors, fainting/black outs, dizziness, memory problems, loss of sensation/numbness, problems walking, weakness, tingling or burning in hands/feet. There is no history of abusive relationship, bipolar disorder, sleep disturbance, anxiety, depression or feeling of despair.    Endocrine:    There is no history of poor/slow wound healing, weight loss/gain, fertility or hormone problems, cold intolerance, thyroid disease.     Allergic/Immunologic:  There is no history of hives, hay fever, angioedema or anaphylaxis.    /80 (BP Location: Left arm, Patient Position: Sitting, Cuff Size: adult)   Pulse 73   Resp 18   Wt 67.1 kg (148 lb)   LMP 12/23/2013   SpO2 100%   BMI 26.22 kg/m²     Physical Exam:  The patient is an alert, oriented, well-nourished and  well-developed woman who appears her stated age. Her speech patterns and movements are normal. Her affect is appropriate.    HEENT: The head is normocephalic. The neck is supple. The thyroid is not enlarged and is without palpable masses/nodules. There are no palpable masses. The trachea is in the midline. Conjunctiva are clear, non-icteric.    Chest: The chest expands symmetrically. The lungs are clear to auscultation.    Heart: The rhythm is regular.  There are no murmurs, rubs, gallops or thrills.    Breasts:  Her breasts are symmetrical with a cup size 36C.  Right breast: The skin, nipple ,and areola appear normal. There is no skin dimpling with movement of the pectoralis. There is no nipple  retraction. No nipple discharge can be elicited. The parenchyma is mildly nodular. There are no dominant masses in the breast. The axillary tail is normal.  Left breast:   The skin, nipple, and areola appear normal. There is no skin dimpling with movement of the pectoralis. There is no nipple retraction. No nipple discharge can be elicited. The parenchyma is mildly nodular. There are no dominant masses in the breast. The axillary tail is normal.  There is a well-healed incision with no signs of infection and a small seroma in the axilla.     Abdomen:  The abdomen is soft, flat and non tender. The liver is not enlarged. There are no palpable masses.    Lymph Nodes:  The supraclavicular, axillary and cervical regions are free of significant lymphadenopathy.    Back: There is no vertebral column tenderness.    Skin: The skin appears normal. There are no suspicious appearing rashes or lesions.    Extremities: The extremities are without deformity, cyanosis or edema.    Impression:   Ms. Lay Michaels is a 70 year old woman presents with imaging detected left breast cancer, s/p lumpectomy with SLNB    Recommendations:   I had a discussion with the Patient regarding her breast exam.  She is healing well since surgery with no signs of infection. I  reviewed her pathology.  This confirmed a 1.1 cm tumor with negative margins and lymph nodes for which no further surgery is needed.  Medical oncology has recommended letrozole and she is awaiting the results of consultation with radiation oncology for further recommendations regarding management.  I anticipate her first set of imaging will take place in 6 months with a clinical exam and follow-up.   I encouraged her to continue monitoring her ROM and strength and explained that a referral to physical therapy may be warranted in the future if she identifies any limitations or restrictions. She was given ample opportunity for questions and those questions were answered to her  satisfaction. She was encouraged to contact the office with any questions or concerns prior to her next scheduled appointment.       This note was created by [employer] voice recognition. Errors in content may be related to improper recognition by the system; efforts to review and correct have been done but errors may still exist. Please be advised the primary purpose of this note is for me to communicate medical care. Standard sentence structure is not always used. Medical terminology and medical abbreviations may be used. There may be grammatical, typographical, and automated fill ins that may have errors missed in proofreading.           [1]   Allergies  Allergen Reactions    Penicillin V Potassium [Penicillin V] RASH    Penicillins RASH

## 2025-06-01 NOTE — PROGRESS NOTES
Breast Surgery Post-Operative Visit    Diagnosis: Invasive ductal carcinoma, left breast, s/p lumpectomy with SLNB on 5/1/2025    Stage: T1 cN0 MX    Disease Status:  Surgical treatment complete, medical and radiation oncology recommendations pending.    History of Present Illness:   Ms. Lay Michaels is a 70 year old woman who presents with an imaging detected concern of the left breast.  The patient denies any palpable masses, nipple discharge, skin changes or axillary symptoms.  She does not have any known family history of breast cancer.  She has no personal prior history of breast disease or biopsies.  She had a screening mammogram in January 2024 that was unremarkable.  She presented this year for screening mammogram on February 25, 2025 and was found to have a focal asymmetry in the upper outer left breast.  She had a left diagnostic evaluation on March 17, 2025 and was confirmed to have a 7 mm spiculated suspicious nodule at 2:00, 6 cm from the nipple for which biopsy was recommended.  She had a biopsy in March 24, 2025 and was confirmed to have invasive ductal carcinoma, grade 1, ER 95%, UT 0%, HER2/dari negative.  She underwent lumpectomy with SLNB, which occurred without complication. She is here for postoperative visit. She reports her pain is under control. She denies erythema, warmth, drainage, or fevers.She is here today for evaluation and recommendations for further therapy.        Past Medical History:    Allergic rhinitis    Arrhythmia    LEFT BBB    Asthma (HCC)    Cancer (HCC)    BREAST    Cataract    Family history of other cardiovascular diseases    H/O colonoscopy    Hemorrhoids    Leaking of urine    Macular degeneration    Renal disorder    CKD 3- PT DENIES    Tubular adenoma of colon    Visual impairment    MACULAR DEGENERATION, DRY EYES,    Wears glasses       Past Surgical History:   Procedure Laterality Date    Colonoscopy  04/23/2015    Tubular adenoma, recheck 5 years    Colonoscopy   2021    Diverticulosis, recheck 5 years          Oral surgery      Other surgical history  2008    C-scope  int hemorrhoids    Other surgical history  2018    LAPAROSCOPIC BILATERAL SALPINGO OOPHORECTOMY WITH LEFT CYSTECTOMY       Gynecological History:  Pt is a   Pt was 23 years old at time of first pregnancy.    She denies any cumulative breastfeeding history  She achieved menarche at age 13 and LMP age 57  Age of Menopause: 57  Type: natural menopause  She denies any history of hormone replacement therapy  She has history of oral contraceptive use for 10 years, last in .  She denies infertility treatment to achieve pregnancy.    Medications:     [DISCONTINUED] ketorolac 0.5 % Ophthalmic Solution Place 1 drop into both eyes in the morning and 1 drop at noon and 1 drop in the evening and 1 drop before bedtime. As needed.      Lutein 15-0.7 MG Oral Cap Take 1 capsule by mouth in the morning.      Cholecalciferol (VITAMIN D) 50 MCG (2000 UT) Oral Tab Take by mouth.      Vitamin B12 100 MCG Oral Tab Take 1 tablet (100 mcg total) by mouth in the morning.      Calcium 500-125 MG-UNIT Oral Tab Take by mouth.      Docusate Sodium (STOOL SOFTENER OR) Take by mouth in the morning.      Multiple Vitamin (DAILY VITAMINS) Oral Tab Take 1 tablet by mouth in the morning.         Allergies:    Allergies[1]    Family History:   Family History   Problem Relation Age of Onset    Cancer Father         Lung, lymphoma,, kidney/ureter    Other (HTN) Father     Other (AAA) Father     Other (Mitral Valve Replacement) Father     Cancer Mother         Kidney    Other (Diverticulitis) Mother     Other (HTN) Mother     Ulcerative Colitis Son     Stroke Paternal Grandmother     Ulcerative Colitis Sister     Crohn's Disease Sister     Bleeding Disorders Sister     Colon Polyps Sister     Crohn's Disease Brother     Alcohol and Other Disorders Associated Brother     Colon Cancer Paternal Aunt     Colon Cancer Paternal  Uncle        She is not of Ashkenazi [place] ancestry.    Social History:  History   Alcohol Use    5.0 standard drinks of alcohol/week    2 Glasses of wine, 3 Standard drinks or equivalent per week     Comment: Occasional        History   Smoking Status    Never   Smokeless Tobacco    Never     Ms. Lay Michaels is  with 3 children. She has 5 siblings. She is currently Retired    Review of Systems:  General:   The patient denies, fever, chills, night sweats, fatigue, generalized weakness, change in appetite or weight loss.    HEENT:     The patient denies +eye irritation, +cataracts, redness, glaucoma, yellowing of the eyes, change in vision, color blindness, or +wearing contacts/glasses. The patient denies hearing loss, ringing in the ears, ear drainage, earaches, nasal congestion, nose bleeds, +snoring, pain in mouth/throat, hoarseness, change in voice, facial trauma.    Respiratory:  The patient denies chronic cough, phlegm, hemoptysis, pleurisy/chest pain, pneumonia, +asthma, wheezing, difficulty in breathing with exertion, emphysema, chronic bronchitis, shortness of breath or abnormal sound when breathing.     Cardiovascular:  There is no history of chest pain, chest pressure/discomfort, palpitations, irregular heartbeat, fainting or near-fainting, difficulty breathing when lying flat, SOB/Coughing at night, swelling of the legs or chest pain while walking.    Breasts:  See history of present illness    Gastrointestinal:     There is no history of difficulty or pain with swallowing, reflux symptoms, vomiting, dark or bloody stools, constipation, yellowing of the skin, indigestion, nausea, change in bowel habits, diarrhea, abdominal pain or vomiting blood.     Genitourinary:  The patient denies +frequent urination, +needing to get up at night to urinate, urinary hesitancy or retaining urine, painful urination, +urinary incontinence, decreased urine stream, blood in the urine or vaginal/penile  discharge.    Skin:    The patient denies rash, itching, skin lesions, dry skin, change in skin color or change in moles.     Hematologic/Lymphatic:  The patient denies easily bruising or bleeding or persistent swollen glands or lymph nodes.     Musculoskeletal:  The patient denies muscle aches/pain, joint pain, stiff joints, neck pain, back pain or bone pain.    Neuropsychiatric:  There is no history of migraines or severe headaches, seizure/epilepsy, speech problems, coordination problems, trembling/tremors, fainting/black outs, dizziness, memory problems, loss of sensation/numbness, problems walking, weakness, tingling or burning in hands/feet. There is no history of abusive relationship, bipolar disorder, sleep disturbance, anxiety, depression or feeling of despair.    Endocrine:    There is no history of poor/slow wound healing, weight loss/gain, fertility or hormone problems, cold intolerance, thyroid disease.     Allergic/Immunologic:  There is no history of hives, hay fever, angioedema or anaphylaxis.    /71 (BP Location: Left arm, Patient Position: Sitting, Cuff Size: adult)   Pulse 68   Resp 16   Wt 66.7 kg (147 lb)   LMP 12/23/2013   SpO2 100%   BMI 26.04 kg/m²     Physical Exam:  The patient is an alert, oriented, well-nourished and  well-developed woman who appears her stated age. Her speech patterns and movements are normal. Her affect is appropriate.    HEENT: The head is normocephalic. The neck is supple. The thyroid is not enlarged and is without palpable masses/nodules. There are no palpable masses. The trachea is in the midline. Conjunctiva are clear, non-icteric.    Chest: The chest expands symmetrically. The lungs are clear to auscultation.    Heart: The rhythm is regular.  There are no murmurs, rubs, gallops or thrills.    Breasts:  Her breasts are symmetrical with a cup size 36C.  Right breast: The skin, nipple ,and areola appear normal. There is no skin dimpling with movement of  the pectoralis. There is no nipple retraction. No nipple discharge can be elicited. The parenchyma is mildly nodular. There are no dominant masses in the breast. The axillary tail is normal.  Left breast:   The skin, nipple, and areola appear normal. There is no skin dimpling with movement of the pectoralis. There is no nipple retraction. No nipple discharge can be elicited. The parenchyma is mildly nodular. There are no dominant masses in the breast. The axillary tail is normal.  There is a well-healed incision with no signs of infection and a small seroma in the axilla.     Abdomen:  The abdomen is soft, flat and non tender. The liver is not enlarged. There are no palpable masses.    Lymph Nodes:  The supraclavicular, axillary and cervical regions are free of significant lymphadenopathy.    Back: There is no vertebral column tenderness.    Skin: The skin appears normal. There are no suspicious appearing rashes or lesions.    Extremities: The extremities are without deformity, cyanosis or edema.    Impression:   Ms. Lay Michaels is a 70 year old woman presents with imaging detected left breast cancer, s/p lumpectomy with SLNB    Recommendations:   I had a discussion with the Patient regarding her breast exam.  She is healing well since surgery with no signs of infection. I  reviewed her pathology.  This confirmed a 1.1 cm tumor with negative margins and lymph nodes for which no further surgery is needed.  She has a small seroma in the axilla and will return for a wound check in 1 week to ensure healing.  She will meet with both medical and radiation oncology for further recommendations regarding management.  I anticipate her first set of imaging will take place in 6 months with a clinical exam and follow-up.  Targeted ultrasound of the left axilla demonstrated a complex fluid collection measuring up to 1 cm in maximal dimension. I encouraged her to continue monitoring her ROM and strength and explained that a  referral to physical therapy may be warranted in the future if she identifies any limitations or restrictions. She was given ample opportunity for questions and those questions were answered to her satisfaction. She was encouraged to contact the office with any questions or concerns prior to her next scheduled appointment.       This note was created by [employer] voice recognition. Errors in content may be related to improper recognition by the system; efforts to review and correct have been done but errors may still exist. Please be advised the primary purpose of this note is for me to communicate medical care. Standard sentence structure is not always used. Medical terminology and medical abbreviations may be used. There may be grammatical, typographical, and automated fill ins that may have errors missed in proofreading.           [1]   Allergies  Allergen Reactions    Penicillin V Potassium [Penicillin V] RASH    Penicillins RASH

## 2025-06-24 ENCOUNTER — OFFICE VISIT (OUTPATIENT)
Dept: FAMILY MEDICINE CLINIC | Facility: CLINIC | Age: 70
End: 2025-06-24

## 2025-06-24 VITALS
SYSTOLIC BLOOD PRESSURE: 114 MMHG | OXYGEN SATURATION: 98 % | RESPIRATION RATE: 16 BRPM | DIASTOLIC BLOOD PRESSURE: 84 MMHG | BODY MASS INDEX: 25.2 KG/M2 | HEIGHT: 63.5 IN | TEMPERATURE: 98 F | WEIGHT: 144 LBS | HEART RATE: 81 BPM

## 2025-06-24 DIAGNOSIS — C50.412 MALIGNANT NEOPLASM OF UPPER-OUTER QUADRANT OF LEFT BREAST IN FEMALE, ESTROGEN RECEPTOR POSITIVE (HCC): ICD-10-CM

## 2025-06-24 DIAGNOSIS — E78.5 DYSLIPIDEMIA: ICD-10-CM

## 2025-06-24 DIAGNOSIS — M85.89 OSTEOPENIA OF MULTIPLE SITES: ICD-10-CM

## 2025-06-24 DIAGNOSIS — H04.123 DRY EYES, BILATERAL: ICD-10-CM

## 2025-06-24 DIAGNOSIS — I44.7 LBBB (LEFT BUNDLE BRANCH BLOCK): ICD-10-CM

## 2025-06-24 DIAGNOSIS — F41.9 ANXIETY: ICD-10-CM

## 2025-06-24 DIAGNOSIS — Z17.0 MALIGNANT NEOPLASM OF UPPER-OUTER QUADRANT OF LEFT BREAST IN FEMALE, ESTROGEN RECEPTOR POSITIVE (HCC): ICD-10-CM

## 2025-06-24 DIAGNOSIS — Z80.0 FAMILY HISTORY OF COLON CANCER: ICD-10-CM

## 2025-06-24 DIAGNOSIS — N18.31 STAGE 3A CHRONIC KIDNEY DISEASE (HCC): ICD-10-CM

## 2025-06-24 DIAGNOSIS — Z80.51 FAMILY HISTORY OF RENAL CANCER: ICD-10-CM

## 2025-06-24 DIAGNOSIS — Z00.00 ENCOUNTER FOR ANNUAL HEALTH EXAMINATION: Primary | ICD-10-CM

## 2025-08-08 ENCOUNTER — NURSE TRIAGE (OUTPATIENT)
Dept: FAMILY MEDICINE CLINIC | Facility: CLINIC | Age: 70
End: 2025-08-08

## 2025-08-08 RX ORDER — PREDNISONE 20 MG/1
20 TABLET ORAL DAILY
Qty: 5 TABLET | Refills: 0 | Status: SHIPPED | OUTPATIENT
Start: 2025-08-08 | End: 2025-08-13

## (undated) DEVICE — TROCAR: Brand: KII® SLEEVE

## (undated) DEVICE — DRAPE TAPE: Brand: CONVERTORS

## (undated) DEVICE — SLEEVE COMPR MD KNEE LEN SGL USE KENDALL SCD

## (undated) DEVICE — SUT MCRYL 4-0 27IN ABSRB UD 19MM PS-2 3/8

## (undated) DEVICE — SUT PERMA- 2-0 18IN FS NABSRB BLK 26MM 3/8

## (undated) DEVICE — SYRINGE MED 5ML STD CLR PLAS LL TIP N CTRL

## (undated) DEVICE — MANIPULATOR CATH ESCP KRNR

## (undated) DEVICE — WECK HORIZON MED BLUE CLIP DISP

## (undated) DEVICE — COVER PRB 5X96IN W/ GEL ULTRACOVER

## (undated) DEVICE — TROCAR: Brand: KII FIOS FIRST ENTRY

## (undated) DEVICE — GYN LAP CDS: Brand: MEDLINE INDUSTRIES, INC.

## (undated) DEVICE — SUTURE VICRYL 0 UR-6

## (undated) DEVICE — TISSUE RETRIEVAL SYSTEM: Brand: INZII RETRIEVAL SYSTEM

## (undated) DEVICE — WECK HORIZON SMALL YELLOW CLIP DISP

## (undated) DEVICE — 3M™ STERI-DRAPE™ INSTRUMENT POUCH 1018: Brand: STERI-DRAPE™

## (undated) DEVICE — Device

## (undated) DEVICE — SYRINGE 20CC LL TIP

## (undated) DEVICE — SOL  .9 1000ML BTL

## (undated) DEVICE — SUTURE MONOCRYL 4-0 PS-2

## (undated) DEVICE — COVER,LIGHT,CAMERA,HARD,1/PK,STRL: Brand: MEDLINE

## (undated) DEVICE — ANTIBACTERIAL UNDYED BRAIDED (POLYGLACTIN 910), SYNTHETIC ABSORBABLE SUTURE: Brand: COATED VICRYL

## (undated) DEVICE — INSULATED BLADE ELECTRODE: Brand: EDGE

## (undated) DEVICE — GLOVE SUR 6.5 SENSICARE PI PIP CRM PWD F

## (undated) DEVICE — CLEAR MONOFILAMENT (POLYDIOXANONE), ABSORBABLE SURGICAL SUTURE: Brand: PDS

## (undated) DEVICE — SOLUTION IRRIG 1000ML 0.9% NACL USP BTL

## (undated) DEVICE — VISUALIZATION SYSTEM: Brand: CLEARIFY

## (undated) DEVICE — GLOVE SURG SENSICARE SZ 6

## (undated) DEVICE — BREAST-HERNIA-PORT CDS-LF: Brand: MEDLINE INDUSTRIES, INC.

## (undated) DEVICE — KENDALL SCD EXPRESS SLEEVES, KNEE LENGTH, MEDIUM: Brand: KENDALL SCD

## (undated) DEVICE — DRAPE PACK CHEST AND U BAR

## (undated) DEVICE — GLOVE SURG TRIUMPH SZ 6-1/2

## (undated) NOTE — LETTER
12/03/20        Zina Vides  [address]      Dear Kati Luz records indicate that you have outstanding lab work and or testing that was ordered for you and has not yet been completed:  Orders Placed This Encounter      CBC W